# Patient Record
Sex: FEMALE | Race: WHITE | NOT HISPANIC OR LATINO | Employment: UNEMPLOYED | URBAN - METROPOLITAN AREA
[De-identification: names, ages, dates, MRNs, and addresses within clinical notes are randomized per-mention and may not be internally consistent; named-entity substitution may affect disease eponyms.]

---

## 2017-01-19 ENCOUNTER — TELEPHONE (OUTPATIENT)
Dept: FAMILY MEDICINE CLINIC | Facility: CLINIC | Age: 52
End: 2017-01-19

## 2017-01-19 RX ORDER — METAXALONE 800 MG/1
800 TABLET ORAL 3 TIMES DAILY PRN
Qty: 10 TABLET | Refills: 0 | Status: SHIPPED | OUTPATIENT
Start: 2017-01-19 | End: 2019-02-27

## 2017-01-19 NOTE — TELEPHONE ENCOUNTER
"Pt is asking for \"5 pills\" of muscle relaxer, was given last summer has a pinched nerve and is currently out of town.  Was given Metaxalone 800mg.   is going to  and bring to her.  "

## 2017-11-07 ENCOUNTER — OFFICE VISIT (OUTPATIENT)
Dept: SLEEP MEDICINE | Facility: HOSPITAL | Age: 52
End: 2017-11-07
Attending: INTERNAL MEDICINE

## 2017-11-07 VITALS
SYSTOLIC BLOOD PRESSURE: 135 MMHG | DIASTOLIC BLOOD PRESSURE: 73 MMHG | HEIGHT: 68 IN | WEIGHT: 230 LBS | HEART RATE: 80 BPM | BODY MASS INDEX: 34.86 KG/M2

## 2017-11-07 DIAGNOSIS — G47.33 OSA (OBSTRUCTIVE SLEEP APNEA): Primary | ICD-10-CM

## 2017-11-07 PROCEDURE — G0463 HOSPITAL OUTPT CLINIC VISIT: HCPCS

## 2017-11-07 NOTE — PROGRESS NOTES
"  Sleep clinic follow up note.  [facility] SLEEP MEDICINE    Mukul Chahal  52 y.o.  female  1965    PCP: Anton Rubio DO      Date of visit: 11/7/2017    INTERM HISTORY:  This is a 52 y.o. years old patient who has a history of sleep apnea (AHI 13?hr) and is on CPAP auto.  Patient reports good compliance with the device and has no problems.  Denies snoring, daytime excessive sleepiness.  She reports that she feels well rested when she wakes up.  She cannot imagine going without CPAP for a day!!!.  The Smart card download has been reviewed and shows the following..  Compliance;100 %  > 4 hr use, 93 %  Residual AHI: 0.9 /hr (normal less than 5)      PAST MEDICAL HISTORY:  · Obstructive sleep apnea  No past medical history on file.    MEDICATIONS:    Current Outpatient Prescriptions:   •  metaxalone (SKELAXIN) 800 MG tablet, Take 1 tablet by mouth 3 (Three) Times a Day As Needed for muscle spasms., Disp: 10 tablet, Rfl: 0    SOCIAL, FAMILY HISTORY: Medical record is reviewed and noted.    REVIEW OF SYSTEMS: [place] Sleepiness Scale :Total score: 11     PHYSICAL EXAMINATION:  Vitals:    11/07/17 1300   BP: 135/73   Pulse: 80   Weight: 230 lb (104 kg)   Height: 68\" (172.7 cm)    Body mass index is 34.97 kg/(m^2).    HEENT: Unremarkable, pupils are round equal and reacting to light   NECK: No lymphadenopathy, throat is clear, oral airway Mallampati class III  RESPRATORY SYSTEM: Breath sounds are equal on both sides and are normal, no wheezes or crackles  CARDIOVASULAR SYSTEM: Heart rate is regular without murmur  ABDOMEN: Soft, no ascites, no hepatosplenomegaly.  EXTREMITIES: No cyanosis, clubbing or edema    ASSESSMENT AND PLAN:  · Obstructive sleep apnea, patient is using the CPAP with good compliance for treatment of sleep apnea.  I have reviewed the smart card down load and discussed with the patient the download data and encouarged the patient to continue to use the CPAP. The residual AHI " is acceptable.  The device is benefiting the patient.  The patient is also instructed to get the CPAP supplies from the DME company and see me in 1 year for follow-up.  The DME company is [employer]  · Obesity, I have discussed weight reduction and the health benefits.  I have also discuss the relationship between the weight and the sleep apnea and encouraged the patient to lose weight        Clark Bland MD, [facility]  Pulmonary, Critical Care and sleep Medicine

## 2018-10-16 DIAGNOSIS — Z00.00 ROUTINE HEALTH MAINTENANCE: Primary | ICD-10-CM

## 2018-10-16 DIAGNOSIS — Z00.00 ANNUAL PHYSICAL EXAM: ICD-10-CM

## 2018-11-08 ENCOUNTER — OFFICE VISIT (OUTPATIENT)
Dept: SLEEP MEDICINE | Facility: HOSPITAL | Age: 53
End: 2018-11-08
Attending: INTERNAL MEDICINE

## 2018-11-08 VITALS
HEART RATE: 89 BPM | DIASTOLIC BLOOD PRESSURE: 81 MMHG | OXYGEN SATURATION: 96 % | HEIGHT: 69 IN | SYSTOLIC BLOOD PRESSURE: 122 MMHG | WEIGHT: 218.6 LBS | BODY MASS INDEX: 32.38 KG/M2

## 2018-11-08 DIAGNOSIS — J44.9 OSA AND COPD OVERLAP SYNDROME (HCC): Primary | ICD-10-CM

## 2018-11-08 DIAGNOSIS — G47.33 OSA AND COPD OVERLAP SYNDROME (HCC): Primary | ICD-10-CM

## 2018-11-08 PROCEDURE — G0463 HOSPITAL OUTPT CLINIC VISIT: HCPCS

## 2018-11-08 NOTE — PROGRESS NOTES
"  SLEEP CLINIC FOLLOW UP PROGRESS NOTE.    [facility] SLEEP MEDICINE    Mukul Chahal  53 y.o.  female  1965    PCP: Anton Rubio DO      Date of visit: 11/8/2018    INTERM HISTORY:  This is a 53 y.o. years old patient who has a history of sleep apnea and is on CPAP.  Patient reports good compliance with the device and has no problems.     The Smart card download has been reviewed and shows the following..  Compliance;100 %  > 4 hr use, 100 %  Residual AHI: 0.9 /hr (normal less than 5)      PAST MEDICAL HISTORY:  · Obstructive sleep apnea  No past medical history on file.    MEDICATIONS:    Current Outpatient Prescriptions:   •  metaxalone (SKELAXIN) 800 MG tablet, Take 1 tablet by mouth 3 (Three) Times a Day As Needed for muscle spasms., Disp: 10 tablet, Rfl: 0    Allergies   Allergen Reactions   • No Known Drug Allergy        SOCIAL, FAMILY HISTORY: Medical records are reviewed and noted.    REVIEW OF SYSTEMS:   [place] Sleepiness Scale :Total score: 9   Snoring no  Feels refreshed after waking up: yes  Morning headaches no  Nasal congestion no  Leg movements no    PHYSICAL EXAMINATION:  Vitals:    11/08/18 1522   BP: 122/81   BP Location: Left arm   Patient Position: Sitting   Pulse: 89   SpO2: 96%   Weight: 99.2 kg (218 lb 9.6 oz)   Height: 175.3 cm (69\")    Body mass index is 32.28 kg/m². Neck Circumference: 14.5 inches  HEENT: Unremarkable, pupils are round equal and reacting to light, nasal passage is clear   NECK: No lymphadenopathy, throat is clear, oral airway Mallampati class III  RESPRATORY SYSTEM: Breath sounds are equal on both sides and are normal, no wheezes or crackles  CARDIOVASULAR SYSTEM: Heart rate is regular without murmur  ABDOMEN: Soft, no ascites, no hepatosplenomegaly.  EXTREMITIES: No cyanosis, clubbing or edema       ASSESSMENT AND PLAN:  · Obstructive sleep apnea, patient is using the CPAP with good compliance for treatment of sleep apnea.  I have reviewed the " smart card down load and discussed with the patient the download data and encouarged the patient to continue to use the CPAP. The residual AHI is acceptable.  The device is benefiting the patient.  The patient is also instructed to get the CPAP supplies from the DME company and see me in 1 year for follow-up.  The DME company is [employer]  · Obesity, I have discussed weight reduction and the health benefits.  I have also discuss the relationship between the weight and the sleep apnea and encouraged the patient to lose weight  · Inadequate sleep and I have talked with patient about increasing her sleep hours she is not getting enough sleep        Clark Bland MD, [facility]  Pulmonary, Critical Care and sleep Medicine

## 2019-02-20 LAB
25(OH)D3+25(OH)D2 SERPL-MCNC: 44.6 NG/ML
ALBUMIN SERPL-MCNC: 4 G/DL (ref 3.5–5.2)
ALBUMIN/GLOB SERPL: 1.4 G/DL
ALP SERPL-CCNC: 77 U/L (ref 40–129)
ALT SERPL-CCNC: 28 U/L (ref 5–33)
AST SERPL-CCNC: 20 U/L (ref 5–32)
BASOPHILS # BLD AUTO: 0.06 10*3/MM3 (ref 0–0.2)
BASOPHILS NFR BLD AUTO: 0.6 % (ref 0–1.5)
BILIRUB SERPL-MCNC: 0.4 MG/DL (ref 0.2–1.2)
BUN SERPL-MCNC: 14 MG/DL (ref 6–20)
BUN/CREAT SERPL: 22.2 (ref 7–25)
CALCIUM SERPL-MCNC: 8.8 MG/DL (ref 8.6–10.5)
CHLORIDE SERPL-SCNC: 100 MMOL/L (ref 98–107)
CHOLEST SERPL-MCNC: 190 MG/DL (ref 0–200)
CO2 SERPL-SCNC: 26.8 MMOL/L (ref 22–29)
CREAT SERPL-MCNC: 0.63 MG/DL (ref 0.57–1)
EOSINOPHIL # BLD AUTO: 0.13 10*3/MM3 (ref 0–0.4)
EOSINOPHIL NFR BLD AUTO: 1.4 % (ref 0.3–6.2)
ERYTHROCYTE [DISTWIDTH] IN BLOOD BY AUTOMATED COUNT: 13 % (ref 12.3–15.4)
GLOBULIN SER CALC-MCNC: 2.9 GM/DL
GLUCOSE SERPL-MCNC: 122 MG/DL (ref 65–99)
HCT VFR BLD AUTO: 42.4 % (ref 34–46.6)
HDLC SERPL-MCNC: 67 MG/DL (ref 40–60)
HGB BLD-MCNC: 13.7 G/DL (ref 12–15.9)
IMM GRANULOCYTES # BLD AUTO: 0.04 10*3/MM3 (ref 0–0.05)
IMM GRANULOCYTES NFR BLD AUTO: 0.4 % (ref 0–0.5)
LDLC SERPL CALC-MCNC: 90 MG/DL (ref 0–100)
LYMPHOCYTES # BLD AUTO: 2.04 10*3/MM3 (ref 0.7–3.1)
LYMPHOCYTES NFR BLD AUTO: 21.6 % (ref 19.6–45.3)
MCH RBC QN AUTO: 30.4 PG (ref 26.6–33)
MCHC RBC AUTO-ENTMCNC: 32.3 G/DL (ref 31.5–35.7)
MCV RBC AUTO: 94 FL (ref 79–97)
MONOCYTES # BLD AUTO: 1.01 10*3/MM3 (ref 0.1–0.9)
MONOCYTES NFR BLD AUTO: 10.7 % (ref 5–12)
NEUTROPHILS # BLD AUTO: 6.17 10*3/MM3 (ref 1.4–7)
NEUTROPHILS NFR BLD AUTO: 65.3 % (ref 42.7–76)
NRBC BLD AUTO-RTO: 0 /100 WBC (ref 0–0)
PLATELET # BLD AUTO: 352 10*3/MM3 (ref 140–450)
POTASSIUM SERPL-SCNC: 4.3 MMOL/L (ref 3.5–5.2)
PROT SERPL-MCNC: 6.9 G/DL (ref 6–8.5)
RBC # BLD AUTO: 4.51 10*6/MM3 (ref 3.77–5.28)
SODIUM SERPL-SCNC: 138 MMOL/L (ref 136–145)
TRIGL SERPL-MCNC: 163 MG/DL (ref 0–150)
TSH SERPL DL<=0.005 MIU/L-ACNC: 1.38 MIU/ML (ref 0.27–4.2)
VLDLC SERPL CALC-MCNC: 32.6 MG/DL (ref 7–27)
WBC # BLD AUTO: 9.45 10*3/MM3 (ref 3.4–10.8)

## 2019-02-22 LAB
HBA1C MFR BLD: 6 % (ref 4.8–5.6)
Lab: NORMAL
WRITTEN AUTHORIZATION: NORMAL

## 2019-02-27 ENCOUNTER — OFFICE VISIT (OUTPATIENT)
Dept: FAMILY MEDICINE CLINIC | Facility: CLINIC | Age: 54
End: 2019-02-27

## 2019-02-27 VITALS
BODY MASS INDEX: 34.21 KG/M2 | SYSTOLIC BLOOD PRESSURE: 138 MMHG | WEIGHT: 231 LBS | HEIGHT: 69 IN | HEART RATE: 94 BPM | DIASTOLIC BLOOD PRESSURE: 86 MMHG | OXYGEN SATURATION: 98 %

## 2019-02-27 DIAGNOSIS — E78.1 HYPERTRIGLYCERIDEMIA: ICD-10-CM

## 2019-02-27 DIAGNOSIS — Z12.39 ENCOUNTER FOR SCREENING FOR MALIGNANT NEOPLASM OF BREAST: ICD-10-CM

## 2019-02-27 DIAGNOSIS — R73.02 GLUCOSE INTOLERANCE (IMPAIRED GLUCOSE TOLERANCE): ICD-10-CM

## 2019-02-27 DIAGNOSIS — E66.09 EXOGENOUS OBESITY: ICD-10-CM

## 2019-02-27 DIAGNOSIS — Z00.01 ENCOUNTER FOR WELL ADULT EXAM WITH ABNORMAL FINDINGS: Primary | ICD-10-CM

## 2019-02-27 LAB
DEVELOPER EXPIRATION DATE: NORMAL
DEVELOPER LOT NUMBER: NORMAL
EXPIRATION DATE: NORMAL
FECAL OCCULT BLOOD SCREEN, POC: NEGATIVE
Lab: NORMAL
NEGATIVE CONTROL: NEGATIVE
POSITIVE CONTROL: POSITIVE

## 2019-02-27 PROCEDURE — 99396 PREV VISIT EST AGE 40-64: CPT | Performed by: FAMILY MEDICINE

## 2019-02-27 PROCEDURE — 82270 OCCULT BLOOD FECES: CPT | Performed by: FAMILY MEDICINE

## 2019-02-27 NOTE — PROGRESS NOTES
"Subjective   Mukul Chahal is a 53 y.o. woman who comes in today for a  pap smear only. Patient is a  2 para 1 Ab 1 with last menstrual period of approximately 2 weeks ago.  Her most recent annual exam was more than 1 year ago ago and showed no abnormalities. Previous abnormal Pap smears: no. Contraception: abstinence. Last mammogram 2016. Last Dexa scan never.  Patient continues to have monthly menses which are still on a relatively regular basis.  Past medical history is somewhat sparse and patient is on no prescriptive medications.  She is  and last sexual encounter was a year ago.  Much of this is secondary to some vaginal discomfort with intercourse.  The couple has not tried lubricants and other forms of treatment.    The following portions of the patient's history were reviewed and updated as appropriate: allergies, current medications, past family history, past medical history, past social history, past surgical history and problem list.    Review of Systems  Pertinent items are noted in HPI.    neck is supple without adenopathy or thyromegaly.  Carotid upstrokes +2 and symmetrical with no evidence of bruit bilaterally.  Heart regular rhythm without murmur gallop or rub.  Lungs clear to auscultation with good bilateral breath sounds.  Abdomen is soft and flat with positive normoactive bowel sounds.  There is no evidence of mass organomegaly.  No evidence of tenderness with deep palpation.  No evidence of guarding or rebound.  Distal pulses are +2 and symmetrical.  Breasts are symmetrical with nipples that are inverted/everted and with/without discharge.  Breasts are nontender and without skin changes.  There is no mass palpable.    Objective   /86   Pulse 94   Ht 175.3 cm (69\")   Wt 105 kg (231 lb)   LMP 2019   SpO2 98%   BMI 34.11 kg/m²   Pelvic Exam: cervix normal in appearance, external genitalia normal, no adnexal masses or tenderness, no bladder tenderness, " no cervical motion tenderness, perianal skin: no external genital warts noted, rectovaginal septum normal, urethra without abnormality or discharge, uterus normal size, shape, and consistency and vagina normal without discharge. Pap smear obtained.  Office Visit on 02/27/2019   Component Date Value Ref Range Status   • Fecal Occult Blood 02/27/2019 Negative  Negative Final   • Lot Number 02/27/2019 [id number]   Final   • Expiration Date 02/27/2019 02/29/2020   Final   • DEVELOPER LOT NUMBER 02/27/2019 [id number]   Final   • DEVELOPER EXPIRATION DATE 02/27/2019 02/29/2020   Final   • Positive Control 02/27/2019 Positive  Positive Final   • Negative Control 02/27/2019 Negative  Negative Final     Office Visit on 02/27/2019   Component Date Value Ref Range Status   • Fecal Occult Blood 02/27/2019 Negative  Negative Final   • Lot Number 02/27/2019 [id number]   Final   • Expiration Date 02/27/2019 02/29/2020   Final   • DEVELOPER LOT NUMBER 02/27/2019 [id number]   Final   • DEVELOPER EXPIRATION DATE 02/27/2019 02/29/2020   Final   • Positive Control 02/27/2019 Positive  Positive Final   • Negative Control 02/27/2019 Negative  Negative Final     See labs 2/30/2019    Assessment/Plan   Screening pap smear.    Follow up in 1 year, or as indicated by Pap results.    Mukul was seen today for annual exam.    Diagnoses and all orders for this visit:    Encounter for well adult exam with abnormal findings  -     Pap IG, Ct-Ng, Rfx HPV All  -     POCT occult blood x 1 stool    Encounter for screening for malignant neoplasm of breast  -     Mammo Screening Bilateral With CAD; Future  -     Mammo Screening Bilateral With CAD    Glucose intolerance (impaired glucose tolerance)    Exogenous obesity    Hypertriglyceridemia        No current outpatient medications on file.

## 2019-02-28 PROBLEM — R73.02 GLUCOSE INTOLERANCE (IMPAIRED GLUCOSE TOLERANCE): Status: ACTIVE | Noted: 2019-02-28

## 2019-02-28 PROBLEM — E78.1 HYPERTRIGLYCERIDEMIA: Status: ACTIVE | Noted: 2019-02-28

## 2019-02-28 NOTE — PATIENT INSTRUCTIONS
Long discussion in regards to her sugar status as well as triglycerides.  Weight loss will be beneficial which will include a more disciplined diet and exercise program.  This will include limiting her carbohydrate intake which will certainly influence the above issues.  Discussion also in regards to vitamin D with recommendation to start vitamin D3 at approximately 5000 IU/day.  Have recommended rechecking fasting laboratory studies in 6 months to include CBC, CMP, lipids, A1c, and vitamin D.

## 2019-03-04 LAB
C TRACH RRNA CVX QL NAA+PROBE: NEGATIVE
CONV .: NORMAL
CYTOLOGIST CVX/VAG CYTO: NORMAL
CYTOLOGY CVX/VAG DOC THIN PREP: NORMAL
DX ICD CODE: NORMAL
HIV 1 & 2 AB SER-IMP: NORMAL
N GONORRHOEA RRNA CVX QL NAA+PROBE: NEGATIVE
OTHER STN SPEC: NORMAL
PATH REPORT.FINAL DX SPEC: NORMAL
STAT OF ADQ CVX/VAG CYTO-IMP: NORMAL

## 2019-11-07 ENCOUNTER — OFFICE VISIT (OUTPATIENT)
Dept: SLEEP MEDICINE | Facility: HOSPITAL | Age: 54
End: 2019-11-07
Attending: INTERNAL MEDICINE

## 2019-11-07 VITALS
SYSTOLIC BLOOD PRESSURE: 134 MMHG | HEIGHT: 69 IN | WEIGHT: 233 LBS | DIASTOLIC BLOOD PRESSURE: 69 MMHG | BODY MASS INDEX: 34.51 KG/M2 | OXYGEN SATURATION: 98 % | HEART RATE: 83 BPM

## 2019-11-07 DIAGNOSIS — J44.9 OSA AND COPD OVERLAP SYNDROME (HCC): Primary | ICD-10-CM

## 2019-11-07 DIAGNOSIS — G47.33 OSA AND COPD OVERLAP SYNDROME (HCC): Primary | ICD-10-CM

## 2019-11-07 PROCEDURE — G0463 HOSPITAL OUTPT CLINIC VISIT: HCPCS

## 2019-11-07 PROCEDURE — 99213 OFFICE O/P EST LOW 20 MIN: CPT | Performed by: INTERNAL MEDICINE

## 2019-11-07 NOTE — PROGRESS NOTES
"  [facility]  [address] [facility]  3rd [place] [address]  Phone   Fax       SLEEP CLINIC FOLLOW UP PROGRESS NOTE.    Mukul Chahal  1965  54 y.o.  female      PCP: Anton Rubio DO      Date of visit: 11/13/2019    Chief Complaint   Patient presents with   • Sleep Apnea   • Follow-up       INTERM HISTORY:  This is a 54 y.o. years old patient who has a history of sleep apnea and is on CPAP.  Patient reports good compliance with the device.  She says that the humidity is set too high and is putting out too much water.  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    Sleep schedule  Normally goes to bed at 11 PM  Wakes up at 6 AM  Feels refreshed after waking up: Yes    The Smart card downloaded on 11/13/2019 has been reviewed by me and shows the following..  Compliance; 100 %  > 4 hr use, 100 %  Average use of the device 5 hours and 59 minutes per night  Residual AHI: 1.2 /hr (normal less than 5)      History reviewed. No pertinent past medical history.    MEDICATIONS: reviewed by me  No current outpatient medications on file.    Allergies   Allergen Reactions   • No Known Drug Allergy     reviewed by me    SOCIAL, FAMILY HISTORY: Medical records are reviewed and noted by me.    REVIEW OF SYSTEMS:   [place] Sleepiness Scale :Total score: 12   Snoring: Resolved  Morning headache: No  Nasal congestion: No      PHYSICAL EXAMINATION:  Vitals:    11/07/19 1508   BP: 134/69   Pulse: 83   SpO2: 98%   Weight: 106 kg (233 lb)   Height: 175.3 cm (69\")    Body mass index is 34.41 kg/m².    HEENT: pupils are round equal and reacting to light and accommodation, nasal passage is clear, no nasal polyps, no lymphadenopathy, throat is clear, oral airway Mallampati class 3  RESPRATORY SYSTEM: Breath sounds are equal on both sides and are normal, no wheezes or crackles  CARDIOVASULAR SYSTEM: Heart rate is regular without murmur  ABDOMEN: Soft, no ascites, no " hepatosplenomegaly.  EXTREMITIES: No cyanosis, clubbing or edema       ASSESSMENT AND PLAN:  · Obstructive sleep apnea, patient is using the CPAP with good compliance for treatment of sleep apnea.  I have reviewed the smart card down load and discussed with the patient the download data and encouarged the patient to continue to use the CPAP. The residual AHI is acceptable.  The device is benefiting the patient.  The patient is also instructed to get the CPAP supplies from the DME company and see me in 1 year for follow-up.  The prescription for supplies has been sent to the [employer].  The DME company is [employer].  I have reduced the humidity to 4 from 5  · Obesity, patient's BMI is Body mass index is 34.41 kg/m²..  I have discussed weight reduction and the health benefits.  I have also discuss the relationship between the weight and sleep apnea and encouraged the patient to lose weight.          Clark Bland MD, [facility]  Sleep Medicine.(Board-certified)  [facility]   11/13/2019

## 2020-01-13 ENCOUNTER — HOSPITAL ENCOUNTER (OUTPATIENT)
Dept: GENERAL RADIOLOGY | Facility: HOSPITAL | Age: 55
Discharge: HOME | End: 2020-01-13
Admitting: FAMILY MEDICINE

## 2020-01-13 ENCOUNTER — OFFICE VISIT (OUTPATIENT)
Dept: FAMILY MEDICINE CLINIC | Facility: CLINIC | Age: 55
End: 2020-01-13

## 2020-01-13 ENCOUNTER — HOSPITAL ENCOUNTER (OUTPATIENT)
Dept: GENERAL RADIOLOGY | Facility: HOSPITAL | Age: 55
Discharge: HOME | End: 2020-01-13

## 2020-01-13 VITALS
WEIGHT: 231 LBS | HEART RATE: 89 BPM | DIASTOLIC BLOOD PRESSURE: 72 MMHG | SYSTOLIC BLOOD PRESSURE: 128 MMHG | BODY MASS INDEX: 34.21 KG/M2 | OXYGEN SATURATION: 97 % | HEIGHT: 69 IN

## 2020-01-13 DIAGNOSIS — F41.8 DEPRESSION WITH ANXIETY: ICD-10-CM

## 2020-01-13 DIAGNOSIS — G89.29 CHRONIC PAIN OF LEFT ANKLE: ICD-10-CM

## 2020-01-13 DIAGNOSIS — M25.572 CHRONIC PAIN OF LEFT ANKLE: ICD-10-CM

## 2020-01-13 DIAGNOSIS — M72.2 PLANTAR FASCIITIS: Primary | ICD-10-CM

## 2020-01-13 DIAGNOSIS — F43.0 STRESS REACTION: ICD-10-CM

## 2020-01-13 PROCEDURE — 99214 OFFICE O/P EST MOD 30 MIN: CPT | Performed by: FAMILY MEDICINE

## 2020-01-13 PROCEDURE — 73620 X-RAY EXAM OF FOOT: CPT

## 2020-01-13 PROCEDURE — 73610 X-RAY EXAM OF ANKLE: CPT

## 2020-01-14 NOTE — PROGRESS NOTES
Subjective   Mukul Chahal is a 54 y.o. female with   Chief Complaint   Patient presents with   • heel spur and pain     left   .    History of Present Illness   54-year-old white female here with multiple issues and for further medical management.  Patient complains of chronic left heel as well as ankle pain.  There is been no trauma or initiating event.  Believes she has plantar fasciitis and has a known heel spur.  She also complains of left lateral ankle pain contained soft tissue swelling and pain without knowledge of trauma or initiating event.  Range of motion remains full with motor that is intact as well as sensation.  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  Patient does not elaborate as to why.  She also feels that she has been suffering from depression for several years and is now coming to a point where she is getting ready to have this treated.  Moods are frequently low with increased amount of tearfulness.  Denies suicidal or homicidal ideation.  The following portions of the patient's history were reviewed and updated as appropriate: allergies, current medications, past family history, past medical history, past social history, past surgical history and problem list.    Review of Systems   Constitutional: Positive for fatigue.   Musculoskeletal: Positive for arthralgias.   Psychiatric/Behavioral: Positive for dysphoric mood and sleep disturbance. Negative for self-injury and suicidal ideas. The patient is nervous/anxious.        Objective     Vitals:    01/13/20 0909   BP: 128/72   Pulse: 89   SpO2: 97%       No results found for this or any previous visit (from the past 672 hour(s)).    Physical Exam   Constitutional: She is oriented to person, place, and time. She appears well-developed and well-nourished.   Obese with a BMI of 34.1   HENT:   Head: Normocephalic and atraumatic.    Neck: Neck supple.   Musculoskeletal:   Left ankle with appears to be soft tissue edema portion surrounding the lateral malleolus.  Left ankle and foot demonstrate full range of motion, motor 5/5 and neurovascular intact distally.  Point of maximum tenderness is in the calcaneal tubercle with some generalized tenderness in the lateral ankle region.  No evidence of erythema or increased local heat.   Neurological: She is alert and oriented to person, place, and time. She has normal strength. No sensory deficit. Gait normal.   Skin: Skin is warm and dry. No rash noted.   Psychiatric: Her speech is normal and behavior is normal. Judgment and thought content normal. Her affect is labile. Cognition and memory are normal.   Patient is tearful through much of this conversation.   Nursing note and vitals reviewed.      Assessment/Plan   Mukul was seen today for heel spur and pain.    Diagnoses and all orders for this visit:    Plantar fasciitis  -     XR Foot 2 View Left    Chronic pain of left ankle  -     XR Ankle 3+ View Left    Depression with anxiety    Stress reaction    X-rays will be obtained of left ankle and foot.  Plantar fasciitis handout has been given with recommendation to acquire a Zenput crosstrainer insole.  Patient will follow-up in this office within the next 2 weeks and further discussion will take place in regards to her stress reaction and depression.  Have begun to lay the ground work for the use of medication.    Return in about 4 weeks (around 2/10/2020) for Recheck.

## 2020-01-20 DIAGNOSIS — R73.02 GLUCOSE INTOLERANCE (IMPAIRED GLUCOSE TOLERANCE): ICD-10-CM

## 2020-01-20 DIAGNOSIS — E78.1 HYPERTRIGLYCERIDEMIA: Primary | ICD-10-CM

## 2020-01-20 LAB
ALBUMIN SERPL-MCNC: 4.4 G/DL (ref 3.5–5.2)
ALBUMIN/GLOB SERPL: 1.7 G/DL
ALP SERPL-CCNC: 88 U/L (ref 39–117)
ALT SERPL-CCNC: 32 U/L (ref 1–33)
AST SERPL-CCNC: 19 U/L (ref 1–32)
BASOPHILS # BLD AUTO: 0.05 10*3/MM3 (ref 0–0.2)
BASOPHILS NFR BLD AUTO: 0.6 % (ref 0–1.5)
BILIRUB SERPL-MCNC: 0.5 MG/DL (ref 0.2–1.2)
BUN SERPL-MCNC: 11 MG/DL (ref 6–20)
BUN/CREAT SERPL: 17.2 (ref 7–25)
CALCIUM SERPL-MCNC: 9.4 MG/DL (ref 8.6–10.5)
CHLORIDE SERPL-SCNC: 100 MMOL/L (ref 98–107)
CHOLEST SERPL-MCNC: 213 MG/DL (ref 0–200)
CO2 SERPL-SCNC: 25.9 MMOL/L (ref 22–29)
CREAT SERPL-MCNC: 0.64 MG/DL (ref 0.57–1)
EOSINOPHIL # BLD AUTO: 0.1 10*3/MM3 (ref 0–0.4)
EOSINOPHIL NFR BLD AUTO: 1.2 % (ref 0.3–6.2)
ERYTHROCYTE [DISTWIDTH] IN BLOOD BY AUTOMATED COUNT: 11.8 % (ref 12.3–15.4)
GLOBULIN SER CALC-MCNC: 2.6 GM/DL
GLUCOSE SERPL-MCNC: 134 MG/DL (ref 65–99)
HBA1C MFR BLD: 6.8 % (ref 4.8–5.6)
HCT VFR BLD AUTO: 40.6 % (ref 34–46.6)
HDLC SERPL-MCNC: 63 MG/DL (ref 40–60)
HGB BLD-MCNC: 14 G/DL (ref 12–15.9)
IMM GRANULOCYTES # BLD AUTO: 0.01 10*3/MM3 (ref 0–0.05)
IMM GRANULOCYTES NFR BLD AUTO: 0.1 % (ref 0–0.5)
LDLC SERPL CALC-MCNC: 116 MG/DL (ref 0–100)
LYMPHOCYTES # BLD AUTO: 1.77 10*3/MM3 (ref 0.7–3.1)
LYMPHOCYTES NFR BLD AUTO: 22 % (ref 19.6–45.3)
MCH RBC QN AUTO: 30.8 PG (ref 26.6–33)
MCHC RBC AUTO-ENTMCNC: 34.5 G/DL (ref 31.5–35.7)
MCV RBC AUTO: 89.2 FL (ref 79–97)
MONOCYTES # BLD AUTO: 0.62 10*3/MM3 (ref 0.1–0.9)
MONOCYTES NFR BLD AUTO: 7.7 % (ref 5–12)
NEUTROPHILS # BLD AUTO: 5.5 10*3/MM3 (ref 1.7–7)
NEUTROPHILS NFR BLD AUTO: 68.4 % (ref 42.7–76)
NRBC BLD AUTO-RTO: 0 /100 WBC (ref 0–0.2)
PLATELET # BLD AUTO: 349 10*3/MM3 (ref 140–450)
POTASSIUM SERPL-SCNC: 4.6 MMOL/L (ref 3.5–5.2)
PROT SERPL-MCNC: 7 G/DL (ref 6–8.5)
RBC # BLD AUTO: 4.55 10*6/MM3 (ref 3.77–5.28)
SODIUM SERPL-SCNC: 140 MMOL/L (ref 136–145)
TRIGL SERPL-MCNC: 172 MG/DL (ref 0–150)
TSH SERPL DL<=0.005 MIU/L-ACNC: 0.86 UIU/ML (ref 0.27–4.2)
VLDLC SERPL CALC-MCNC: 34.4 MG/DL
WBC # BLD AUTO: 8.05 10*3/MM3 (ref 3.4–10.8)

## 2020-02-03 ENCOUNTER — OFFICE VISIT (OUTPATIENT)
Dept: FAMILY MEDICINE CLINIC | Facility: CLINIC | Age: 55
End: 2020-02-03

## 2020-02-03 VITALS
HEART RATE: 73 BPM | DIASTOLIC BLOOD PRESSURE: 76 MMHG | BODY MASS INDEX: 34.8 KG/M2 | OXYGEN SATURATION: 99 % | SYSTOLIC BLOOD PRESSURE: 126 MMHG | HEIGHT: 69 IN | WEIGHT: 235 LBS

## 2020-02-03 DIAGNOSIS — E11.9 CONTROLLED TYPE 2 DIABETES MELLITUS WITHOUT COMPLICATION, WITHOUT LONG-TERM CURRENT USE OF INSULIN (HCC): ICD-10-CM

## 2020-02-03 DIAGNOSIS — G89.29 CHRONIC PAIN OF LEFT ANKLE: Primary | ICD-10-CM

## 2020-02-03 DIAGNOSIS — E78.2 MIXED HYPERLIPIDEMIA: ICD-10-CM

## 2020-02-03 DIAGNOSIS — M25.472 LEFT ANKLE SWELLING: ICD-10-CM
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
## 2020-02-03 DIAGNOSIS — M25.572 CHRONIC PAIN OF LEFT ANKLE: Primary | ICD-10-CM

## 2020-02-03 DIAGNOSIS — E66.09 EXOGENOUS OBESITY: ICD-10-CM

## 2020-02-03 PROCEDURE — 99214 OFFICE O/P EST MOD 30 MIN: CPT | Performed by: FAMILY MEDICINE

## 2020-02-04 PROBLEM — R73.02 GLUCOSE INTOLERANCE (IMPAIRED GLUCOSE TOLERANCE): Status: RESOLVED | Noted: 2019-02-28 | Resolved: 2020-02-04

## 2020-02-04 PROBLEM — E11.9 CONTROLLED TYPE 2 DIABETES MELLITUS WITHOUT COMPLICATION, WITHOUT LONG-TERM CURRENT USE OF INSULIN (HCC): Status: ACTIVE | Noted: 2020-02-04

## 2020-02-04 PROBLEM — E78.2 MIXED HYPERLIPIDEMIA: Status: ACTIVE | Noted: 2020-02-04

## 2020-02-04 PROBLEM — E78.1 HYPERTRIGLYCERIDEMIA: Status: RESOLVED | Noted: 2019-02-28 | Resolved: 2020-02-04

## 2020-02-04 LAB
ANA SER QL: NEGATIVE
ERYTHROCYTE [SEDIMENTATION RATE] IN BLOOD BY WESTERGREN METHOD: 13 MM/HR (ref 0–40)
RHEUMATOID FACT SERPL-ACNC: <10 IU/ML (ref 0–13.9)
URATE SERPL-MCNC: 5.2 MG/DL (ref 2.5–7.1)

## 2020-02-05 NOTE — PROGRESS NOTES
Subjective   Mukul Chahal is a 54 y.o. female with   Chief Complaint   Patient presents with   • prediabetic   • Foot Pain     left heel   .    History of Present Illness   54-year-old white female with history of glucose intolerance preceded by fasting laboratory studies.  Patient is here for review of same.  She has not done well over the holidays having gained weight.  She has not participated in an exercise program primarily to an ankle injury that occurred several months ago that continues to have swelling in the lateral portion of the left ankle.  The ankle still causes pain and prevents her from exercising.  She also has history of other arthralgias with some morning stiffness and increased soft tissue swelling.  These appear to be both hands and at times both wrists and both knees.  Patient also complains of left heel pain which seems to be worse upon initially weightbearing.  This does improve with ongoing walking only to recur again.  Patient is currently on no medication.  She does have [place] and COPD.  There is also a history of hypertriglyceridemia.  Patient also with recent tick bite which left no ashlie including no evidence of rash or other arthralgias.  She does wonder about Lyme's.  The following portions of the patient's history were reviewed and updated as appropriate: allergies, current medications, past family history, past medical history, past social history, past surgical history and problem list.    Review of Systems   Constitutional:        Obese   Cardiovascular:        Hypertriglyceridemia   Endocrine:        Glucose intolerance   Musculoskeletal: Positive for arthralgias and joint swelling.       Objective     Vitals:    02/03/20 1425   BP: 126/76   Pulse: 73   SpO2: 99%       Recent Results (from the past 672 hour(s))   CBC & Differential    Collection Time: 01/20/20 10:45 AM   Result Value Ref Range    WBC 8.05 3.40 - 10.80 10*3/mm3    RBC 4.55 3.77 - 5.28 10*6/mm3    Hemoglobin 14.0  12.0 - 15.9 g/dL    Hematocrit 40.6 34.0 - 46.6 %    MCV 89.2 79.0 - 97.0 fL    MCH 30.8 26.6 - 33.0 pg    MCHC 34.5 31.5 - 35.7 g/dL    RDW 11.8 (L) 12.3 - 15.4 %    Platelets 349 140 - 450 10*3/mm3    Neutrophil Rel % 68.4 42.7 - 76.0 %    Lymphocyte Rel % 22.0 19.6 - 45.3 %    Monocyte Rel % 7.7 5.0 - 12.0 %    Eosinophil Rel % 1.2 0.3 - 6.2 %    Basophil Rel % 0.6 0.0 - 1.5 %    Neutrophils Absolute 5.50 1.70 - 7.00 10*3/mm3    Lymphocytes Absolute 1.77 0.70 - 3.10 10*3/mm3    Monocytes Absolute 0.62 0.10 - 0.90 10*3/mm3    Eosinophils Absolute 0.10 0.00 - 0.40 10*3/mm3    Basophils Absolute 0.05 0.00 - 0.20 10*3/mm3    Immature Granulocyte Rel % 0.1 0.0 - 0.5 %    Immature Grans Absolute 0.01 0.00 - 0.05 10*3/mm3    nRBC 0.0 0.0 - 0.2 /100 WBC   Comprehensive Metabolic Panel    Collection Time: 01/20/20 10:45 AM   Result Value Ref Range    Glucose 134 (H) 65 - 99 mg/dL    BUN 11 6 - 20 mg/dL    Creatinine 0.64 0.57 - 1.00 mg/dL    eGFR Non African Am 97 >60 mL/min/1.73    eGFR African Am 117 >60 mL/min/1.73    BUN/Creatinine Ratio 17.2 7.0 - 25.0    Sodium 140 136 - 145 mmol/L    Potassium 4.6 3.5 - 5.2 mmol/L    Chloride 100 98 - 107 mmol/L    Total CO2 25.9 22.0 - 29.0 mmol/L    Calcium 9.4 8.6 - 10.5 mg/dL    Total Protein 7.0 6.0 - 8.5 g/dL    Albumin 4.40 3.50 - 5.20 g/dL    Globulin 2.6 gm/dL    A/G Ratio 1.7 g/dL    Total Bilirubin 0.5 0.2 - 1.2 mg/dL    Alkaline Phosphatase 88 39 - 117 U/L    AST (SGOT) 19 1 - 32 U/L    ALT (SGPT) 32 1 - 33 U/L   Hemoglobin A1c    Collection Time: 01/20/20 10:45 AM   Result Value Ref Range    Hemoglobin A1C 6.80 (H) 4.80 - 5.60 %   Lipid Panel    Collection Time: 01/20/20 10:45 AM   Result Value Ref Range    Total Cholesterol 213 (H) 0 - 200 mg/dL    Triglycerides 172 (H) 0 - 150 mg/dL    HDL Cholesterol 63 (H) 40 - 60 mg/dL    VLDL Cholesterol 34.4 mg/dL    LDL Cholesterol  116 (H) 0 - 100 mg/dL   TSH    Collection Time: 01/20/20 10:45 AM   Result Value Ref Range     TSH 0.863 0.270 - 4.200 uIU/mL   Uric acid    Collection Time: 02/03/20  3:18 PM   Result Value Ref Range    Uric Acid 5.2 2.5 - 7.1 mg/dL   ROSY    Collection Time: 02/03/20  3:18 PM   Result Value Ref Range    ROSY Direct Negative Negative   Sedimentation Rate    Collection Time: 02/03/20  3:18 PM   Result Value Ref Range    Sed Rate 13 0 - 40 mm/hr   Rheumatoid Factor, Quant    Collection Time: 02/03/20  3:18 PM   Result Value Ref Range    RA Latex Turbid <10.0 0.0 - 13.9 IU/mL       Physical Exam   Constitutional: She is oriented to person, place, and time. She appears well-developed and well-nourished.   HENT:   Head: Normocephalic and atraumatic.   Neck: Trachea normal and phonation normal. Neck supple. Normal carotid pulses present. Carotid bruit is not present. No thyroid mass and no thyromegaly present.   Cardiovascular: Normal rate, regular rhythm and normal heart sounds. Exam reveals no gallop and no friction rub.   No murmur heard.  Pulmonary/Chest: Effort normal and breath sounds normal. No respiratory distress. She has no decreased breath sounds. She has no wheezes. She has no rhonchi. She has no rales.   Musculoskeletal:   Left ankle with soft tissue swelling surrounding the lateral malleolus.  Range of motion is full with motor 5/5 and neurovascular intact distally.  No evidence of bony tenderness.   Lymphadenopathy:     She has no cervical adenopathy.   Neurological: She is alert and oriented to person, place, and time. She has normal strength. No sensory deficit. Gait normal.   Skin: Skin is warm and dry. No rash noted.   Psychiatric: She has a normal mood and affect. Her speech is normal and behavior is normal. Judgment and thought content normal. Cognition and memory are normal.   Nursing note and vitals reviewed.      Assessment/Plan   Mukul was seen today for prediabetic and foot pain.    Diagnoses and all orders for this visit:    Chronic pain of left ankle  -     Uric acid  -     Tick  Panel - Blood, Arm, Left  -     ROSY  -     Sedimentation Rate  -     Rheumatoid Factor, Quant    Left ankle swelling  -     Uric acid  -     Tick Panel - Blood, Arm, Left  -     ROSY  -     Sedimentation Rate  -     Rheumatoid Factor, Quant    Controlled type 2 diabetes mellitus without complication, without long-term current use of insulin (CMS/[facility])    Mixed hyperlipidemia    Exogenous obesity    Tick bite, initial encounter    Have had long conversation in regards to the treatment of type II non-insulin-dependent diabetes mellitus.  Have offered patient an additional chance to lose weight as well as watch cholesterol in her diet.  She chooses this option rather than medication and states that she will comply.  Fasting labs will be acquired in 3 to 4 months with follow-up in this office thereafter.  Arthritic studies will be run in regards to arthralgias and soft tissue swelling.  Further disposition/follow-up based on the above.    Return if symptoms worsen or fail to improve.

## 2020-03-03 ENCOUNTER — OFFICE VISIT (OUTPATIENT)
Dept: FAMILY MEDICINE CLINIC | Facility: CLINIC | Age: 55
End: 2020-03-03

## 2020-03-03 VITALS
HEIGHT: 69 IN | WEIGHT: 226 LBS | BODY MASS INDEX: 33.47 KG/M2 | DIASTOLIC BLOOD PRESSURE: 68 MMHG | OXYGEN SATURATION: 97 % | HEART RATE: 86 BPM | SYSTOLIC BLOOD PRESSURE: 120 MMHG

## 2020-03-03 DIAGNOSIS — F41.8 DEPRESSION WITH ANXIETY: ICD-10-CM

## 2020-03-03 DIAGNOSIS — B35.1 ONYCHOMYCOSIS: ICD-10-CM

## 2020-03-03 DIAGNOSIS — E11.9 CONTROLLED TYPE 2 DIABETES MELLITUS WITHOUT COMPLICATION, WITHOUT LONG-TERM CURRENT USE OF INSULIN (HCC): Primary | ICD-10-CM

## 2020-03-03 DIAGNOSIS — E66.09 EXOGENOUS OBESITY: ICD-10-CM

## 2020-03-03 DIAGNOSIS — E78.2 MIXED HYPERLIPIDEMIA: ICD-10-CM

## 2020-03-03 PROCEDURE — 99214 OFFICE O/P EST MOD 30 MIN: CPT | Performed by: FAMILY MEDICINE

## 2020-03-03 RX ORDER — TERBINAFINE HYDROCHLORIDE 250 MG/1
250 TABLET ORAL DAILY
Qty: 30 TABLET | Refills: 2 | Status: SHIPPED | OUTPATIENT
Start: 2020-03-03

## 2020-03-04 NOTE — PROGRESS NOTES
Subjective   Mukul Chahal is a 54 y.o. female with   Chief Complaint   Patient presents with   • Depression   .    History of Present Illness   54-year-old white female with multiple medical issues here for further medical management.  Patient with thickened discolored distorted great toenails who is ready now for medical treatment for same.  This has been diagnosis onychomycosis in the past but no treatment has been rendered.  Patient was reticent to take an oral antifungal but is ready at this point.  Distal left great toenail is actually curving distally causing discomfort.  Patient has also followed with intermittent depression for 2 years.  There have been several issues personally that seem to have exacerbated her symptoms such as the death of her mother and the dementia of her father with inability to access him frequently enough.  She is estranged from her sister who is caretaking for her father.  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  She is raising 2 adoptive children and has a biologic child in her late teens.  She sleeps readily often wakes up.  Her appetite is good.  She does state that her energy and motivation as well as concentration remain good but moods are often decreased.  Anxiety is for the most part not present.  She denies suicidal or homicidal ideation.  She has been participating in activities of usual enjoyment.  Libido is decreased.  Patient denies easy agitation or increased irritability.  The following portions of the patient's history were reviewed and updated as appropriate: allergies, current medications, past family history, past medical history, past social history, past surgical history and problem list.    Review of Systems   Skin:        Onychomycosis   Psychiatric/Behavioral: Positive for dysphoric mood.       Objective     Vitals:    03/03/20 1334   BP: 120/68   Pulse: 86   SpO2: 97%       No results found for this or any previous  visit (from the past 672 hour(s)).    Physical Exam   Constitutional: She is oriented to person, place, and time. She appears well-developed and well-nourished.   HENT:   Head: Normocephalic and atraumatic.   Neck: Trachea normal and phonation normal. Neck supple. Normal carotid pulses present. Carotid bruit is not present. No thyroid mass and no thyromegaly present.   Cardiovascular: Normal rate, regular rhythm and normal heart sounds. Exam reveals no gallop and no friction rub.   No murmur heard.  Pulmonary/Chest: Effort normal and breath sounds normal. No respiratory distress. She has no decreased breath sounds. She has no wheezes. She has no rhonchi. She has no rales.   Lymphadenopathy:     She has no cervical adenopathy.   Neurological: She is alert and oriented to person, place, and time.   Skin: Skin is warm and dry. No rash noted.   Markedly thickened and discolored left great toenail with distal distortion.   Psychiatric: She has a normal mood and affect. Her speech is normal and behavior is normal. Judgment and thought content normal. Cognition and memory are normal.   Nursing note and vitals reviewed.    Office Visit on 02/03/2020   Component Date Value Ref Range Status   • Uric Acid 02/03/2020 5.2  2.5 - 7.1 mg/dL Final               Therapeutic target for gout patients: <6.0   • ROSY Direct 02/03/2020 Negative  Negative Final   • Sed Rate 02/03/2020 13  0 - 40 mm/hr Final   • RA Latex Turbid 02/03/2020 <10.0  0.0 - 13.9 IU/mL Final   Orders Only on 01/20/2020   Component Date Value Ref Range Status   • WBC 01/20/2020 8.05  3.40 - 10.80 10*3/mm3 Final   • RBC 01/20/2020 4.55  3.77 - 5.28 10*6/mm3 Final   • Hemoglobin 01/20/2020 14.0  12.0 - 15.9 g/dL Final   • Hematocrit 01/20/2020 40.6  34.0 - 46.6 % Final   • MCV 01/20/2020 89.2  79.0 - 97.0 fL Final   • MCH 01/20/2020 30.8  26.6 - 33.0 pg Final   • MCHC 01/20/2020 34.5  31.5 - 35.7 g/dL Final   • RDW 01/20/2020 11.8* 12.3 - 15.4 % Final   • Platelets  01/20/2020 349  140 - 450 10*3/mm3 Final   • Neutrophil Rel % 01/20/2020 68.4  42.7 - 76.0 % Final   • Lymphocyte Rel % 01/20/2020 22.0  19.6 - 45.3 % Final   • Monocyte Rel % 01/20/2020 7.7  5.0 - 12.0 % Final   • Eosinophil Rel % 01/20/2020 1.2  0.3 - 6.2 % Final   • Basophil Rel % 01/20/2020 0.6  0.0 - 1.5 % Final   • Neutrophils Absolute 01/20/2020 5.50  1.70 - 7.00 10*3/mm3 Final   • Lymphocytes Absolute 01/20/2020 1.77  0.70 - 3.10 10*3/mm3 Final   • Monocytes Absolute 01/20/2020 0.62  0.10 - 0.90 10*3/mm3 Final   • Eosinophils Absolute 01/20/2020 0.10  0.00 - 0.40 10*3/mm3 Final   • Basophils Absolute 01/20/2020 0.05  0.00 - 0.20 10*3/mm3 Final   • Immature Granulocyte Rel % 01/20/2020 0.1  0.0 - 0.5 % Final   • Immature Grans Absolute 01/20/2020 0.01  0.00 - 0.05 10*3/mm3 Final   • nRBC 01/20/2020 0.0  0.0 - 0.2 /100 WBC Final   • Glucose 01/20/2020 134* 65 - 99 mg/dL Final   • BUN 01/20/2020 11  6 - 20 mg/dL Final   • Creatinine 01/20/2020 0.64  0.57 - 1.00 mg/dL Final   • eGFR Non African Am 01/20/2020 97  >60 mL/min/1.73 Final   • eGFR African Am 01/20/2020 117  >60 mL/min/1.73 Final   • BUN/Creatinine Ratio 01/20/2020 17.2  7.0 - 25.0 Final   • Sodium 01/20/2020 140  136 - 145 mmol/L Final   • Potassium 01/20/2020 4.6  3.5 - 5.2 mmol/L Final   • Chloride 01/20/2020 100  98 - 107 mmol/L Final   • Total CO2 01/20/2020 25.9  22.0 - 29.0 mmol/L Final   • Calcium 01/20/2020 9.4  8.6 - 10.5 mg/dL Final   • Total Protein 01/20/2020 7.0  6.0 - 8.5 g/dL Final   • Albumin 01/20/2020 4.40  3.50 - 5.20 g/dL Final   • Globulin 01/20/2020 2.6  gm/dL Final   • A/G Ratio 01/20/2020 1.7  g/dL Final   • Total Bilirubin 01/20/2020 0.5  0.2 - 1.2 mg/dL Final   • Alkaline Phosphatase 01/20/2020 88  39 - 117 U/L Final   • AST (SGOT) 01/20/2020 19  1 - 32 U/L Final   • ALT (SGPT) 01/20/2020 32  1 - 33 U/L Final   • Hemoglobin A1C 01/20/2020 6.80* 4.80 - 5.60 % Final    Comment: Hemoglobin A1C Ranges:  Increased Risk for  Diabetes  5.7% to 6.4%  Diabetes                     >= 6.5%  Diabetic Goal                < 7.0%     • Total Cholesterol 01/20/2020 213* 0 - 200 mg/dL Final   • Triglycerides 01/20/2020 172* 0 - 150 mg/dL Final   • HDL Cholesterol 01/20/2020 63* 40 - 60 mg/dL Final   • VLDL Cholesterol 01/20/2020 34.4  mg/dL Final   • LDL Cholesterol  01/20/2020 116* 0 - 100 mg/dL Final   • TSH 01/20/2020 0.863  0.270 - 4.200 uIU/mL Final         Assessment/Plan   Mukul was seen today for depression.    Diagnoses and all orders for this visit:    Controlled type 2 diabetes mellitus without complication, without long-term current use of insulin (CMS/[facility])  -     Lipid panel; Future  -     Hemoglobin A1c; Future  -     Comprehensive metabolic panel; Future  -     TSH; Future  -     Vitamin D 25 hydroxy; Future  -     CBC w AUTO Differential; Future    Onychomycosis  -     terbinafine (LAMISIL) 250 MG tablet; Take 1 tablet by mouth Daily.  -     Lipid panel; Future  -     Hemoglobin A1c; Future  -     Comprehensive metabolic panel; Future  -     TSH; Future  -     Vitamin D 25 hydroxy; Future  -     CBC w AUTO Differential; Future    Depression with anxiety  -     Lipid panel; Future  -     Hemoglobin A1c; Future  -     Comprehensive metabolic panel; Future  -     TSH; Future  -     Vitamin D 25 hydroxy; Future  -     CBC w AUTO Differential; Future    Exogenous obesity  -     Lipid panel; Future  -     Hemoglobin A1c; Future  -     Comprehensive metabolic panel; Future  -     TSH; Future  -     Vitamin D 25 hydroxy; Future  -     CBC w AUTO Differential; Future    Mixed hyperlipidemia  -     Lipid panel; Future  -     Hemoglobin A1c; Future  -     Comprehensive metabolic panel; Future  -     TSH; Future  -     Vitamin D 25 hydroxy; Future  -     CBC w AUTO Differential; Future    Long discussion in regard to patient's new onset type II non-insulin-dependent diabetes mellitus.  Status is no longer considered in control with LDL  goal now 70 or less.  Have discussed medications for the above with patient electing to attempt weight loss.  Have also spent 20 minutes of a 30-minute appointment spent counseling in regards to depression.  Have in detail walk-through signs and symptoms of depression and if these increase patient will contact this office for consideration antidepressant use.  Follow-up is anticipated in 3 months preceded by fasting labs unless otherwise indicated.    Return in about 3 months (around 6/3/2020) for Recheck.

## 2020-04-02 DIAGNOSIS — E11.9 CONTROLLED TYPE 2 DIABETES MELLITUS WITHOUT COMPLICATION, WITHOUT LONG-TERM CURRENT USE OF INSULIN (HCC): Primary | ICD-10-CM

## 2020-06-30 ENCOUNTER — RESULTS ENCOUNTER (OUTPATIENT)
Dept: FAMILY MEDICINE CLINIC | Facility: CLINIC | Age: 55
End: 2020-06-30

## 2020-06-30 DIAGNOSIS — F41.8 DEPRESSION WITH ANXIETY: ICD-10-CM

## 2020-06-30 DIAGNOSIS — E66.09 EXOGENOUS OBESITY: ICD-10-CM

## 2020-06-30 DIAGNOSIS — B35.1 ONYCHOMYCOSIS: ICD-10-CM

## 2020-06-30 DIAGNOSIS — E78.2 MIXED HYPERLIPIDEMIA: ICD-10-CM

## 2020-06-30 DIAGNOSIS — E11.9 CONTROLLED TYPE 2 DIABETES MELLITUS WITHOUT COMPLICATION, WITHOUT LONG-TERM CURRENT USE OF INSULIN (HCC): ICD-10-CM

## 2020-09-04 ENCOUNTER — TELEPHONE (OUTPATIENT)
Dept: FAMILY MEDICINE CLINIC | Facility: CLINIC | Age: 55
End: 2020-09-04

## 2020-09-04 NOTE — TELEPHONE ENCOUNTER
Patient called and stated that she was looking to speak with Dr. Rubio regarding a recommendation for a new podiatrist as she is looking to switch from her current provider. She was looking to discuss at an appointment, but had to cancel.    Please advise.  [phone]

## 2020-09-08 NOTE — TELEPHONE ENCOUNTER
PT was seeing Dr Roxanne Zafar but it has been close to 8 years since last seen.  She was taking oral med for toenail fungus and she states you had told her that if it didn't help you would refer her to a podiatrist.    The med didn't work.

## 2020-09-09 DIAGNOSIS — B35.1 ONYCHOMYCOSIS: Primary | ICD-10-CM

## 2020-11-12 ENCOUNTER — APPOINTMENT (OUTPATIENT)
Dept: SLEEP MEDICINE | Facility: HOSPITAL | Age: 55
End: 2020-11-12

## 2020-12-10 ENCOUNTER — OFFICE VISIT (OUTPATIENT)
Dept: SLEEP MEDICINE | Facility: HOSPITAL | Age: 55
End: 2020-12-10

## 2020-12-10 VITALS
SYSTOLIC BLOOD PRESSURE: 136 MMHG | OXYGEN SATURATION: 97 % | DIASTOLIC BLOOD PRESSURE: 70 MMHG | HEIGHT: 68 IN | WEIGHT: 227 LBS | HEART RATE: 67 BPM | BODY MASS INDEX: 34.4 KG/M2

## 2020-12-10 DIAGNOSIS — G47.33 OSA ON CPAP: Primary | ICD-10-CM

## 2020-12-10 DIAGNOSIS — E66.09 CLASS 1 OBESITY DUE TO EXCESS CALORIES WITHOUT SERIOUS COMORBIDITY WITH BODY MASS INDEX (BMI) OF 34.0 TO 34.9 IN ADULT: ICD-10-CM

## 2020-12-10 DIAGNOSIS — Z99.89 OSA ON CPAP: Primary | ICD-10-CM

## 2020-12-10 PROCEDURE — 99213 OFFICE O/P EST LOW 20 MIN: CPT | Performed by: INTERNAL MEDICINE

## 2020-12-10 PROCEDURE — G0463 HOSPITAL OUTPT CLINIC VISIT: HCPCS

## 2020-12-10 NOTE — PROGRESS NOTES
"  [facility]  [address]  3rd [place] [address]  Phone   Fax       SLEEP CLINIC FOLLOW UP PROGRESS NOTE.    Mukul Chahal  1965  55 y.o.  female      PCP: Anton Rubio DO      Date of visit: 12/10/2020    Chief Complaint   Patient presents with   • Sleep Apnea   • Follow-up       INTERM HISTORY:  This is a 55 y.o. years old patient who has a history of sleep apnea and is on CPAP.  Patient reports good compliance with the device.  Patient is here for follow-up of her sleep apnea.  She is using CPAP on a regular basis and feels much better.  [place]    Sleep schedule  Normally goes to bed at 11 PM  Wakes up at 6 AM  Do you feel refreshed after waking up ?:  Yes      The Smart card downloaded on 12/10/2020 has been reviewed by me and shows the following..  Compliance; 100%  > 4 hr use, 93%  Average use of the device 6 hours and 13 minutes per night  Residual AHI: 1.7 /hr (normal less than 5)  Mask type: Full facemask  DME: [employer]      Past Medical History:   Diagnosis Date   • [place] on CPAP     AHI 13/h       MEDICATIONS: reviewed by me    Current Outpatient Medications:   •  glucose blood test strip, USE TO CHECK BLOOD SUGAR ONCE DAILY, Disp: 100 each, Rfl: 1  •  terbinafine (LAMISIL) 250 MG tablet, Take 1 tablet by mouth Daily., Disp: 30 tablet, Rfl: 2    No Known Allergies reviewed by me    SOCIAL, FAMILY HISTORY: Medical records are reviewed and noted by me.  History of smoking no  History of alcohol use per week  Caffeine use 3    REVIEW OF SYSTEMS:   [place] Sleepiness Scale :Total score: 10   Snoring: Resolved  Morning headache: No  Nasal congestion: No  Leg movements: No  Number of times you wake up once asleep: 2  Heart burn no    PHYSICAL EXAMINATION:  Vitals:    12/10/20 1446   BP: 136/70   Pulse: 67   SpO2: 97%   Weight: 103 kg (227 lb)   Height: 172.7 cm (68\")    Body mass index is 34.52 kg/m².  "   HEENT: pupils are round equal and reacting to light and accommodation, nasal passage is clear, no nasal polyps, no lymphadenopathy, throat is clear, oral airway Mallampati class 3  RESPRATORY SYSTEM: Breath sounds are equal on both sides and are normal, no wheezes or crackles  CARDIOVASULAR SYSTEM: Heart rate is regular without murmur  ABDOMEN: Soft, no ascites, no hepatosplenomegaly.  EXTREMITIES: No cyanosis, clubbing or edema       ASSESSMENT AND PLAN:  · Obstructive sleep apnea, patient is using the device with good compliance for treatment of sleep apnea.  I have reviewed the smart card down load and discussed with the patient the download data and encouarged the patient to continue to use the device.  The symptoms have improved and the residual AHI is acceptable.  The device is benefiting the patient and the device is medically necessary. The patient is also instructed to get the supplies from the [employer] and a prescription for supplies has been sent to the DME company.    · Obesity, patient's BMI is Body mass index is 34.52 kg/m²..  I have discussed weight reduction and the health benefits.  I have also discuss the relationship between the weight and sleep apnea and encouraged the patient to lose weight which is beneficial in treating sleep apnea. Discussed diet and exercise with the patient.    · Return to clinic in 12 months for follow-up        Clark Bland MD  Sleep Medicine.(Board-certified)  [facility]  Medical Director for [facility]  12/10/2020

## 2021-01-23 ENCOUNTER — HOSPITAL ENCOUNTER (OUTPATIENT)
Facility: SURGERY CENTER | Age: 56
Setting detail: HOSPITAL OUTPATIENT SURGERY
End: 2021-01-23
Attending: PLASTIC SURGERY | Admitting: PLASTIC SURGERY

## 2021-01-28 ENCOUNTER — TRANSCRIBE ORDERS (OUTPATIENT)
Dept: LAB | Facility: SURGERY CENTER | Age: 56
End: 2021-01-28

## 2021-01-28 DIAGNOSIS — Z01.818 OTHER SPECIFIED PRE-OPERATIVE EXAMINATION: Primary | ICD-10-CM

## 2021-02-20 ENCOUNTER — APPOINTMENT (OUTPATIENT)
Dept: LAB | Facility: SURGERY CENTER | Age: 56
End: 2021-02-20

## 2021-02-22 ENCOUNTER — APPOINTMENT (OUTPATIENT)
Dept: LAB | Facility: SURGERY CENTER | Age: 56
End: 2021-02-22

## 2021-09-02 ENCOUNTER — TELEPHONE (OUTPATIENT)
Dept: FAMILY MEDICINE CLINIC | Facility: CLINIC | Age: 56
End: 2021-09-02

## 2021-09-02 NOTE — TELEPHONE ENCOUNTER
Caller: Mukul Chahal    Relationship to patient: Self    Best call back number: [phone]    Patient is needing: PATIENT STATES SHE AND HER DAUGHTER WERE EXPOSED TO COVID-19 ON Sunday 08/29 AND IS NEEDING A NEGATIVE TEST BY Friday 09/03 TO RETURN TO SCHOOL AND FOR HER  TO RETURN TO WORK. PATIENT IS WANTING TO KNOW IF DR. ROLDAN KNOWS OF ANY LOCATIONS THAT PROVIDE SALIVA TESTING FOR COVID-19 AS SHE IS REFUSING TO HAVE HER OR HER DAUGHTER GO THROUGH [facility]

## 2021-12-09 ENCOUNTER — APPOINTMENT (OUTPATIENT)
Dept: SLEEP MEDICINE | Facility: HOSPITAL | Age: 56
End: 2021-12-09

## 2021-12-27 DIAGNOSIS — Z20.828 EXPOSURE TO INFLUENZA: Primary | ICD-10-CM

## 2021-12-27 RX ORDER — OSELTAMIVIR PHOSPHATE 75 MG/1
75 CAPSULE ORAL DAILY
Qty: 7 CAPSULE | Refills: 0 | Status: SHIPPED | OUTPATIENT
Start: 2021-12-27 | End: 2021-12-27 | Stop reason: SDUPTHER

## 2021-12-27 RX ORDER — OSELTAMIVIR PHOSPHATE 75 MG/1
75 CAPSULE ORAL DAILY
Qty: 7 CAPSULE | Refills: 0 | Status: SHIPPED | OUTPATIENT
Start: 2021-12-27 | End: 2022-01-03

## 2022-01-13 ENCOUNTER — TELEPHONE (OUTPATIENT)
Dept: FAMILY MEDICINE CLINIC | Facility: CLINIC | Age: 57
End: 2022-01-13

## 2022-01-13 ENCOUNTER — TELEMEDICINE (OUTPATIENT)
Dept: FAMILY MEDICINE CLINIC | Facility: CLINIC | Age: 57
End: 2022-01-13

## 2022-01-13 DIAGNOSIS — U07.1 COVID-19 VIRUS INFECTION: Primary | ICD-10-CM

## 2022-01-13 PROCEDURE — 99213 OFFICE O/P EST LOW 20 MIN: CPT | Performed by: FAMILY MEDICINE

## 2022-01-13 RX ORDER — IVERMECTIN 3 MG/1
TABLET ORAL
Qty: 5 TABLET | Refills: 0 | Status: SHIPPED | OUTPATIENT
Start: 2022-01-13

## 2022-01-13 NOTE — TELEPHONE ENCOUNTER
I would probably have them quarantine over the next 48 hours to see if they develop any symptoms and if not then no further need to stay home

## 2022-01-13 NOTE — TELEPHONE ENCOUNTER
Caller: Mukul Chahal    Relationship to patient: Self    Best call back number: [phone]    Date of positive COVID19 test: 1/12/22    Additional information or concerns: PATIENT WOULD LIKE TO KNOW WHAT HER FAMILY NEEDS TO DO ABOUT QUARANTINE.  HER  IS VACCINATED WITH 1 DOSE OF AMEE AND AMEE AND HER CHILDREN ARE NOT VACCINATED.  THEY ARE NOT SHOWING SYMPTOMS AT THIS TIME.       PLEASE CALL AND ADVISE

## 2022-01-13 NOTE — PROGRESS NOTES
Subjective   Mukul Chahal is a 56 y.o. female with No chief complaint on file.  .    History of Present Illness   56-year-old white female with consented video visit with 2-day history of congestion, frontal headache as well as fatigue and myalgias.  Patient has knowledge of a COVID exposure and did a home test which was positive.  She had hydroxychloroquine and ivermectin at home and has started both as of today.  The ivermectin she actually started yesterday and already feels improvement although she is using half the dose that she needs.  She denies chest pain, shortness of air or audible wheezing.  There has been no nausea/vomiting or diarrhea.  The following portions of the patient's history were reviewed and updated as appropriate: allergies, current medications, past family history, past medical history, past social history, past surgical history and problem list.    Review of Systems   Constitutional: Positive for fatigue. Negative for fever.   HENT: Positive for congestion. Negative for sore throat.    Respiratory: Negative for cough, shortness of breath and wheezing.    Cardiovascular: Negative for chest pain.   Musculoskeletal: Positive for myalgias.       Objective     There were no vitals filed for this visit.    No results found for this or any previous visit (from the past 672 hour(s)).    Physical Exam  Vitals and nursing note reviewed.   Constitutional:       Appearance: Normal appearance. She is well-developed and well-groomed.   HENT:      Head: Normocephalic and atraumatic.   Musculoskeletal:      Cervical back: Neck supple.   Skin:     General: Skin is warm and dry.      Findings: No rash.   Neurological:      Mental Status: She is alert and oriented to person, place, and time.   Psychiatric:         Attention and Perception: Attention and perception normal.         Mood and Affect: Mood and affect normal.         Speech: Speech normal.         Behavior: Behavior normal. Behavior is  cooperative.         Thought Content: Thought content normal.         Cognition and Memory: Cognition and memory normal.         Judgment: Judgment normal.         Assessment/Plan   Diagnoses and all orders for this visit:    1. COVID-19 virus infection (Primary)  -     ivermectin (STROMECTOL) 3 MG tablet tablet; 40 mg p.o. daily for 5 days, please compound  Dispense: 5 tablet; Refill: 0      Consented video visit required 15 minutes for completion.  Return if symptoms worsen or fail to improve.

## 2023-04-06 ENCOUNTER — OFFICE VISIT (OUTPATIENT)
Dept: SLEEP MEDICINE | Facility: HOSPITAL | Age: 58
End: 2023-04-06
Payer: COMMERCIAL

## 2023-04-06 VITALS
HEIGHT: 68 IN | WEIGHT: 227 LBS | HEART RATE: 84 BPM | BODY MASS INDEX: 34.4 KG/M2 | DIASTOLIC BLOOD PRESSURE: 83 MMHG | SYSTOLIC BLOOD PRESSURE: 150 MMHG | OXYGEN SATURATION: 97 %

## 2023-04-06 DIAGNOSIS — E66.09 CLASS 1 OBESITY DUE TO EXCESS CALORIES WITHOUT SERIOUS COMORBIDITY WITH BODY MASS INDEX (BMI) OF 34.0 TO 34.9 IN ADULT: ICD-10-CM

## 2023-04-06 DIAGNOSIS — G47.33 OSA ON CPAP: Primary | ICD-10-CM

## 2023-04-06 DIAGNOSIS — Z99.89 OSA ON CPAP: Primary | ICD-10-CM

## 2023-04-06 PROCEDURE — G0463 HOSPITAL OUTPT CLINIC VISIT: HCPCS

## 2023-04-06 PROCEDURE — 99213 OFFICE O/P EST LOW 20 MIN: CPT | Performed by: INTERNAL MEDICINE

## 2023-04-06 NOTE — PROGRESS NOTES
"  [facility]  [address]  [address]  [place] [address]  Phone   Fax       SLEEP CLINIC FOLLOW UP PROGRESS NOTE.    Mukul Chahal  [id number]   1965  57 y.o.  female      PCP: Anton Rubio DO      Date of visit: 4/6/2023    Chief Complaint   Patient presents with   • Sleep Apnea   • Obesity       HPI:  This is a 57 y.o. years old patient is here for the management of obstructive sleep apnea.  Sleep apnea is mild in severity with a AHI of 13/hr. Patient is using positive airway pressure therapy with auto CPAP.  Unfortunately is he not been able to use the CPAP because he has no supplies and also she recently got her replacement CPAP from [employer].  She did need supplies as soon as possible so that she can use the CPAP.  Without the CPAP her symptoms are getting worse    Medications and allergies are reviewed by me and documented in the encounter.     SOCIAL (habits pertaining to sleep medicine)  • History tobacco use:No   • History of alcohol use: 2 per week  • Caffeine use: 2     REVIEW OF SYSTEMS:   Pertaining positive symptoms are:  • [place] Sleepiness Scale :Total score: 8         PHYSICAL EXAMINATION:  CONSTITUTIONAL:  Vitals:    04/06/23 0900   BP: 150/83   Pulse: 84   SpO2: 97%   Weight: 103 kg (227 lb)   Height: 172.7 cm (67.99\")    Body mass index is 34.52 kg/m².   NOSE: nasal passages are clear, No deformities noted   RESP SYSTEM: Not in any respiratory distress, no chest deformities noted,   CARDIOVASULAR: No edema noted  NEURO: Oriented x 3, gait normal,  Mood and affect appeared appropriate      Data reviewed:  The Smart card downloaded on 4/6/2023 has been reviewed independently by me for compliance and discussed the data with the patient.   Needs supplies as soon as possible  Mask type: Nasal pillows  Device: [employer]  DME: [employer]      ASSESSMENT AND PLAN:  · Obstructive sleep apnea ( G 47.33).  She needs supplies as soon " as possible so that she can start using the CPAP.  She recently got her replacement from [employer]   .Without proper control of sleep apnea and good compliance there is a increased risk for hypertension, diabetes mellitus and nonrestorative sleep with hypersomnia which can increase risk for motor vehicle accidents.  Untreated sleep apnea is also a risk factor for development of atrial fibrillation, pulmonary hypertension, insulin resistance and stroke. The patient is also instructed to get the supplies from the [email or web address] and and change them on a regular basis.  A prescription for supplies has been sent to the [employer] company.  I have also discussed the good sleep hygiene habits and adequate amount of sleep needed for good health.  · Obesity  1 with BMI is Body mass index is 34.52 kg/m².. I have discuss the relationship between the weight and sleep apnea. The benefit of weight loss in reducing severity of sleep apnea was discussed. Discussed diet and exercise with the patient to achieve ideal BMI.   · Return in about 1 year (around 4/6/2024) for with smart card down load. . Patient's questions were answered.    4/6/2023  Clark Bland MD  Sleep Medicine.  Medical Director,   [facility], [place] and [place] [facility].

## 2023-09-12 ENCOUNTER — OFFICE VISIT (OUTPATIENT)
Dept: FAMILY MEDICINE CLINIC | Facility: CLINIC | Age: 58
End: 2023-09-12
Payer: COMMERCIAL

## 2023-09-12 VITALS
WEIGHT: 214 LBS | OXYGEN SATURATION: 98 % | SYSTOLIC BLOOD PRESSURE: 132 MMHG | HEART RATE: 72 BPM | BODY MASS INDEX: 32.43 KG/M2 | HEIGHT: 68 IN | DIASTOLIC BLOOD PRESSURE: 84 MMHG | TEMPERATURE: 97.7 F

## 2023-09-12 DIAGNOSIS — E11.9 CONTROLLED TYPE 2 DIABETES MELLITUS WITHOUT COMPLICATION, WITHOUT LONG-TERM CURRENT USE OF INSULIN: ICD-10-CM

## 2023-09-12 DIAGNOSIS — E78.2 MIXED HYPERLIPIDEMIA: ICD-10-CM

## 2023-09-12 DIAGNOSIS — M19.90 ARTHRITIS: ICD-10-CM

## 2023-09-12 DIAGNOSIS — E66.09 CLASS 1 OBESITY DUE TO EXCESS CALORIES WITHOUT SERIOUS COMORBIDITY WITH BODY MASS INDEX (BMI) OF 34.0 TO 34.9 IN ADULT: ICD-10-CM

## 2023-09-12 DIAGNOSIS — G89.29 CHRONIC LOW BACK PAIN WITHOUT SCIATICA, UNSPECIFIED BACK PAIN LATERALITY: ICD-10-CM

## 2023-09-12 DIAGNOSIS — L40.9 PSORIASIS OF SCALP: Primary | ICD-10-CM

## 2023-09-12 DIAGNOSIS — M54.50 CHRONIC LOW BACK PAIN WITHOUT SCIATICA, UNSPECIFIED BACK PAIN LATERALITY: ICD-10-CM

## 2023-09-12 PROCEDURE — 99213 OFFICE O/P EST LOW 20 MIN: CPT | Performed by: FAMILY MEDICINE

## 2023-09-12 RX ORDER — CLOBETASOL PROPIONATE 0.46 MG/ML
SOLUTION TOPICAL 2 TIMES DAILY
Qty: 50 ML | Refills: 2 | Status: SHIPPED | OUTPATIENT
Start: 2023-09-12

## 2023-09-12 NOTE — PROGRESS NOTES
Subjective   Mukul Chahal is a 57 y.o. female with   Chief Complaint   Patient presents with    Skin Lesion     scalp   .    History of Present Illness   57-year-old white female with raised erythematous scaly patches in her scalp that have been occurring off and on over the last 2 years.  1 lesion she scratched without the ability to see this and opened a large wound.  It eventually healed.  She has no other rash anywhere else on her body.  She has tried over-the-counter topical remedies without benefit.  No past history of same.  She does have arthritis as well as low back pain and is concerned that she may have underlying psoriatic arthritis.  The following portions of the patient's history were reviewed and updated as appropriate: allergies, current medications, past family history, past medical history, past social history, past surgical history and problem list.    Review of Systems   Musculoskeletal:  Positive for arthralgias and back pain.   Skin:  Positive for rash.     Objective     Vitals:    09/12/23 0830   BP: 132/84   Pulse: 72   Temp: 97.7 °F (36.5 °C)   SpO2: 98%       No results found for this or any previous visit (from the past 672 hour(s)).    Physical Exam  Vitals and nursing note reviewed.   Constitutional:       Appearance: Normal appearance. She is well-developed and well-groomed.   HENT:      Head: Normocephalic and atraumatic.   Musculoskeletal:      Cervical back: Neck supple.   Skin:     General: Skin is warm and dry.      Findings: Erythema and rash present.      Comments: 1 or 2 lesions of raised well-circumscribed erythematous patches with somewhat of a scaly surface.  These are found in the scalp and are somewhat difficult to assess.  Skin without other abnormality.   Neurological:      Mental Status: She is alert and oriented to person, place, and time.   Psychiatric:         Attention and Perception: Attention and perception normal.         Mood and Affect: Mood and affect  normal.         Speech: Speech normal.         Behavior: Behavior normal. Behavior is cooperative.         Thought Content: Thought content normal.         Cognition and Memory: Cognition and memory normal.         Judgment: Judgment normal.       Assessment & Plan   Diagnoses and all orders for this visit:    1. Psoriasis of scalp (Primary)  -     clobetasol (TEMOVATE) 0.05 % external solution; Apply  topically to the appropriate area as directed 2 (Two) Times a Day.  Dispense: 50 mL; Refill: 2  -     Hemoglobin A1c; Future  -     TSH; Future  -     Uric acid; Future  -     Comprehensive metabolic panel; Future  -     C-reactive protein; Future  -     Vitamin D 25 hydroxy; Future  -     Lipid panel; Future  -     Sedimentation rate, automated; Future  -     CBC w AUTO Differential; Future  -     ROSY; Future  -     Hepatitis C antibody; Future  -     Rheumatoid Factor, Quant; Future    2. Mixed hyperlipidemia  -     Hemoglobin A1c; Future  -     TSH; Future  -     Uric acid; Future  -     Comprehensive metabolic panel; Future  -     C-reactive protein; Future  -     Vitamin D 25 hydroxy; Future  -     Lipid panel; Future  -     Sedimentation rate, automated; Future  -     CBC w AUTO Differential; Future  -     ROSY; Future  -     Hepatitis C antibody; Future  -     Rheumatoid Factor, Quant; Future    3. Controlled type 2 diabetes mellitus without complication, without long-term current use of insulin  -     Hemoglobin A1c; Future  -     TSH; Future  -     Uric acid; Future  -     Comprehensive metabolic panel; Future  -     C-reactive protein; Future  -     Vitamin D 25 hydroxy; Future  -     Lipid panel; Future  -     Sedimentation rate, automated; Future  -     CBC w AUTO Differential; Future  -     ROSY; Future  -     Hepatitis C antibody; Future  -     Rheumatoid Factor, Quant; Future    4. Class 1 obesity due to excess calories without serious comorbidity with body mass index (BMI) of 34.0 to 34.9 in adult  -      Hemoglobin A1c; Future  -     TSH; Future  -     Uric acid; Future  -     Comprehensive metabolic panel; Future  -     C-reactive protein; Future  -     Vitamin D 25 hydroxy; Future  -     Lipid panel; Future  -     Sedimentation rate, automated; Future  -     CBC w AUTO Differential; Future  -     ROSY; Future  -     Hepatitis C antibody; Future  -     Rheumatoid Factor, Quant; Future    5. Arthritis  -     Hemoglobin A1c; Future  -     TSH; Future  -     Uric acid; Future  -     Comprehensive metabolic panel; Future  -     C-reactive protein; Future  -     Vitamin D 25 hydroxy; Future  -     Lipid panel; Future  -     Sedimentation rate, automated; Future  -     CBC w AUTO Differential; Future  -     ROSY; Future  -     Hepatitis C antibody; Future  -     Rheumatoid Factor, Quant; Future    6. Chronic low back pain without sciatica, unspecified back pain laterality  -     Hemoglobin A1c; Future  -     TSH; Future  -     Uric acid; Future  -     Comprehensive metabolic panel; Future  -     C-reactive protein; Future  -     Vitamin D 25 hydroxy; Future  -     Lipid panel; Future  -     Sedimentation rate, automated; Future  -     CBC w AUTO Differential; Future  -     ROSY; Future  -     Hepatitis C antibody; Future  -     Rheumatoid Factor, Quant; Future        Return in about 4 weeks (around 10/10/2023) for Recheck.

## 2023-09-13 ENCOUNTER — TELEPHONE (OUTPATIENT)
Dept: FAMILY MEDICINE CLINIC | Facility: CLINIC | Age: 58
End: 2023-09-13

## 2023-09-13 NOTE — TELEPHONE ENCOUNTER
"  Caller: Mukul Chahal \"Katiuska Sage\"    Relationship: Self    Best call back number:     What is the best time to reach you:     Who are you requesting to speak with (clinical staff, provider,  specific staff member):     Do you know the name of the person who called:     What was the call regarding: PATIENT IS CALLING IN TO REQUEST A CALL BACK FROM DR ROLDAN OR STAFF AS SHE WANTS RECOMMENDATIONS ON A DERMATOLOGIST FOR HER DAUGHTER.     Is it okay if the provider responds through MyChart:         "

## 2023-12-18 ENCOUNTER — OFFICE VISIT (OUTPATIENT)
Dept: FAMILY MEDICINE CLINIC | Facility: CLINIC | Age: 58
End: 2023-12-18
Payer: COMMERCIAL

## 2023-12-18 VITALS
WEIGHT: 214 LBS | OXYGEN SATURATION: 96 % | HEIGHT: 68 IN | TEMPERATURE: 97.7 F | SYSTOLIC BLOOD PRESSURE: 134 MMHG | BODY MASS INDEX: 32.43 KG/M2 | HEART RATE: 101 BPM | DIASTOLIC BLOOD PRESSURE: 84 MMHG

## 2023-12-18 DIAGNOSIS — N90.7 SEBACEOUS CYST OF LABIA: ICD-10-CM

## 2023-12-18 DIAGNOSIS — B37.31 MONILIAL VAGINITIS: ICD-10-CM

## 2023-12-18 DIAGNOSIS — L40.9 PSORIASIS OF SCALP: Primary | ICD-10-CM

## 2023-12-18 PROCEDURE — 99214 OFFICE O/P EST MOD 30 MIN: CPT | Performed by: FAMILY MEDICINE

## 2023-12-18 RX ORDER — CLOBETASOL PROPIONATE 0.46 MG/ML
SOLUTION TOPICAL 2 TIMES DAILY
Qty: 50 ML | Refills: 5 | Status: SHIPPED | OUTPATIENT
Start: 2023-12-18

## 2023-12-19 NOTE — PROGRESS NOTES
Subjective   Mukul Chahal is a 58 y.o. female with   Chief Complaint   Patient presents with    Abrasion     On scalp     .    Abrasion  Associated symptoms include a rash.      58-year-old white female with known history of psoriasis of the scalp here for reevaluation.  Patient has been successfully using Temovate external solution with success.  She will need refill of same.  She does not have psoriasis on any other part of the body.  She has labs pending for next draw to include rheumatologic laboratory studies in regards to intermittent low back pain and the potential to have psoriatic arthritis.  This however has not been diagnosed to date.  She also complains of an inflamed subcutaneous mass in the right labial area with generalized erythema and pruritus of the labia in general.  The mass however seems to have improved with time and although she can still palpate this it is somewhat difficult to find it.  She denies intravaginal pruritus and there has been no overt discharge.  The following portions of the patient's history were reviewed and updated as appropriate: allergies, current medications, past family history, past medical history, past social history, past surgical history and problem list.    Review of Systems   Skin:  Positive for rash.        Right labial subcutaneous mass       Objective     Vitals:    12/18/23 1413   BP: 134/84   Pulse: 101   Temp: 97.7 °F (36.5 °C)   SpO2: 96%       No results found for this or any previous visit (from the past 672 hour(s)).    Physical Exam  Vitals and nursing note reviewed.   Constitutional:       Appearance: Normal appearance. She is well-developed and well-groomed.   HENT:      Head: Normocephalic and atraumatic.   Musculoskeletal:      Cervical back: Neck supple.   Skin:     General: Skin is warm and dry.      Findings: Rash present.      Comments: Scalp with small erythematous patches with silver scale.  Labia in general with increased erythema without  overt discharge.  To the right of the right labia within the perineal body is a small palpable subcutaneous mass without inflammation, erythema or induration.  This is consistent with a sebaceous cyst.   Neurological:      Mental Status: She is alert and oriented to person, place, and time.   Psychiatric:         Attention and Perception: Attention and perception normal.         Mood and Affect: Mood and affect normal.         Speech: Speech normal.         Behavior: Behavior normal. Behavior is cooperative.         Thought Content: Thought content normal.         Cognition and Memory: Cognition and memory normal.         Judgment: Judgment normal.         Assessment & Plan   Diagnoses and all orders for this visit:    1. Psoriasis of scalp (Primary)  -     clobetasol (TEMOVATE) 0.05 % external solution; Apply  topically to the appropriate area as directed 2 (Two) Times a Day.  Dispense: 50 mL; Refill: 5    2. Monilial vaginitis  -     terconazole (TERAZOL 7) 0.4 % vaginal cream; Insert 1 applicator into the vagina Every Night.  Dispense: 45 g; Refill: 0    3. Sebaceous cyst of labia    Patient reassured in regards to the sebaceous cyst since this is noninflamed further intervention will be unnecessary.  Terazole cream will be applied manually in the perilabial region.  Refill of Temovate has taken place.  Follow-up will be in 6 months or sooner if needed.    Return in about 6 months (around 6/18/2024), or if symptoms worsen or fail to improve, for Recheck.

## 2024-11-20 DIAGNOSIS — E66.811 CLASS 1 OBESITY DUE TO EXCESS CALORIES WITHOUT SERIOUS COMORBIDITY WITH BODY MASS INDEX (BMI) OF 34.0 TO 34.9 IN ADULT: Primary | ICD-10-CM

## 2024-11-20 DIAGNOSIS — E11.9 CONTROLLED TYPE 2 DIABETES MELLITUS WITHOUT COMPLICATION, WITHOUT LONG-TERM CURRENT USE OF INSULIN: ICD-10-CM

## 2024-11-20 DIAGNOSIS — Z13.21 ENCOUNTER FOR VITAMIN DEFICIENCY SCREENING: ICD-10-CM

## 2024-11-20 DIAGNOSIS — Z13.29 SCREENING FOR THYROID DISORDER: ICD-10-CM

## 2024-11-20 DIAGNOSIS — Z00.00 ANNUAL PHYSICAL EXAM: ICD-10-CM

## 2024-11-20 DIAGNOSIS — F41.8 DEPRESSION WITH ANXIETY: ICD-10-CM

## 2024-11-20 DIAGNOSIS — E66.09 CLASS 1 OBESITY DUE TO EXCESS CALORIES WITHOUT SERIOUS COMORBIDITY WITH BODY MASS INDEX (BMI) OF 34.0 TO 34.9 IN ADULT: Primary | ICD-10-CM

## 2024-11-20 DIAGNOSIS — E78.2 MIXED HYPERLIPIDEMIA: ICD-10-CM

## 2024-12-03 ENCOUNTER — TELEPHONE (OUTPATIENT)
Dept: FAMILY MEDICINE CLINIC | Facility: CLINIC | Age: 59
End: 2024-12-03
Payer: MEDICAID

## 2024-12-03 DIAGNOSIS — M12.9 ARTHRITIS, MULTIPLE JOINT INVOLVEMENT: Primary | ICD-10-CM

## 2024-12-04 ENCOUNTER — LAB (OUTPATIENT)
Dept: LAB | Facility: HOSPITAL | Age: 59
End: 2024-12-04
Payer: MEDICAID

## 2024-12-04 DIAGNOSIS — M12.9 ARTHRITIS, MULTIPLE JOINT INVOLVEMENT: ICD-10-CM

## 2024-12-04 LAB
25(OH)D3 SERPL-MCNC: 41.2 NG/ML (ref 30–100)
ALBUMIN SERPL-MCNC: 4.3 G/DL (ref 3.5–5.2)
ALBUMIN UR-MCNC: <1.2 MG/DL
ALBUMIN/GLOB SERPL: 1.2 G/DL
ALP SERPL-CCNC: 147 U/L (ref 39–117)
ALT SERPL W P-5'-P-CCNC: 40 U/L (ref 1–33)
ANION GAP SERPL CALCULATED.3IONS-SCNC: 9.7 MMOL/L (ref 5–15)
AST SERPL-CCNC: 21 U/L (ref 1–32)
BASOPHILS # BLD AUTO: 0.06 10*3/MM3 (ref 0–0.2)
BASOPHILS NFR BLD AUTO: 0.8 % (ref 0–1.5)
BILIRUB SERPL-MCNC: 0.5 MG/DL (ref 0–1.2)
BILIRUB UR QL STRIP: NEGATIVE
BUN SERPL-MCNC: 17 MG/DL (ref 6–20)
BUN/CREAT SERPL: 39.5 (ref 7–25)
CALCIUM SPEC-SCNC: 9.3 MG/DL (ref 8.6–10.5)
CHLORIDE SERPL-SCNC: 99 MMOL/L (ref 98–107)
CHOLEST SERPL-MCNC: 256 MG/DL (ref 0–200)
CHROMATIN AB SERPL-ACNC: 10.4 IU/ML (ref 0–14)
CLARITY UR: CLEAR
CO2 SERPL-SCNC: 27.3 MMOL/L (ref 22–29)
COLOR UR: YELLOW
CREAT SERPL-MCNC: 0.43 MG/DL (ref 0.57–1)
CREAT UR-MCNC: 30.9 MG/DL
CRP SERPL-MCNC: 0.74 MG/DL (ref 0–0.5)
DEPRECATED RDW RBC AUTO: 38.2 FL (ref 37–54)
EGFRCR SERPLBLD CKD-EPI 2021: 112.2 ML/MIN/1.73
EOSINOPHIL # BLD AUTO: 0.25 10*3/MM3 (ref 0–0.4)
EOSINOPHIL NFR BLD AUTO: 3.5 % (ref 0.3–6.2)
ERYTHROCYTE [DISTWIDTH] IN BLOOD BY AUTOMATED COUNT: 11.8 % (ref 12.3–15.4)
ERYTHROCYTE [SEDIMENTATION RATE] IN BLOOD: 7 MM/HR (ref 0–30)
GLOBULIN UR ELPH-MCNC: 3.7 GM/DL
GLUCOSE SERPL-MCNC: 364 MG/DL (ref 65–99)
GLUCOSE UR STRIP-MCNC: ABNORMAL MG/DL
HBA1C MFR BLD: 14 % (ref 4.8–5.6)
HCT VFR BLD AUTO: 46.2 % (ref 34–46.6)
HDLC SERPL-MCNC: 47 MG/DL (ref 40–60)
HGB BLD-MCNC: 15.6 G/DL (ref 12–15.9)
HGB UR QL STRIP.AUTO: NEGATIVE
IMM GRANULOCYTES # BLD AUTO: 0.02 10*3/MM3 (ref 0–0.05)
IMM GRANULOCYTES NFR BLD AUTO: 0.3 % (ref 0–0.5)
KETONES UR QL STRIP: ABNORMAL
LDLC SERPL CALC-MCNC: 147 MG/DL (ref 0–100)
LDLC/HDLC SERPL: 3.01 {RATIO}
LEUKOCYTE ESTERASE UR QL STRIP.AUTO: NEGATIVE
LYMPHOCYTES # BLD AUTO: 2.26 10*3/MM3 (ref 0.7–3.1)
LYMPHOCYTES NFR BLD AUTO: 31.5 % (ref 19.6–45.3)
MCH RBC QN AUTO: 30 PG (ref 26.6–33)
MCHC RBC AUTO-ENTMCNC: 33.8 G/DL (ref 31.5–35.7)
MCV RBC AUTO: 88.8 FL (ref 79–97)
MICROALBUMIN/CREAT UR: NORMAL MG/G{CREAT}
MONOCYTES # BLD AUTO: 0.54 10*3/MM3 (ref 0.1–0.9)
MONOCYTES NFR BLD AUTO: 7.5 % (ref 5–12)
NEUTROPHILS NFR BLD AUTO: 4.05 10*3/MM3 (ref 1.7–7)
NEUTROPHILS NFR BLD AUTO: 56.4 % (ref 42.7–76)
NITRITE UR QL STRIP: NEGATIVE
NRBC BLD AUTO-RTO: 0 /100 WBC (ref 0–0.2)
PH UR STRIP.AUTO: 6.5 [PH] (ref 5–8)
PLATELET # BLD AUTO: 336 10*3/MM3 (ref 140–450)
PMV BLD AUTO: 9.6 FL (ref 6–12)
POTASSIUM SERPL-SCNC: 5 MMOL/L (ref 3.5–5.2)
PROT SERPL-MCNC: 8 G/DL (ref 6–8.5)
PROT UR QL STRIP: NEGATIVE
RBC # BLD AUTO: 5.2 10*6/MM3 (ref 3.77–5.28)
SODIUM SERPL-SCNC: 136 MMOL/L (ref 136–145)
SP GR UR STRIP: 1.01 (ref 1–1.03)
TRIGL SERPL-MCNC: 338 MG/DL (ref 0–150)
TSH SERPL DL<=0.05 MIU/L-ACNC: 1.24 UIU/ML (ref 0.27–4.2)
URATE SERPL-MCNC: 3.4 MG/DL (ref 2.4–5.7)
UROBILINOGEN UR QL STRIP: ABNORMAL
VLDLC SERPL-MCNC: 62 MG/DL (ref 5–40)
WBC NRBC COR # BLD AUTO: 7.18 10*3/MM3 (ref 3.4–10.8)

## 2024-12-04 PROCEDURE — 80061 LIPID PANEL: CPT | Performed by: FAMILY MEDICINE

## 2024-12-04 PROCEDURE — 86140 C-REACTIVE PROTEIN: CPT

## 2024-12-04 PROCEDURE — 82306 VITAMIN D 25 HYDROXY: CPT | Performed by: FAMILY MEDICINE

## 2024-12-04 PROCEDURE — 85652 RBC SED RATE AUTOMATED: CPT

## 2024-12-04 PROCEDURE — 80050 GENERAL HEALTH PANEL: CPT | Performed by: FAMILY MEDICINE

## 2024-12-04 PROCEDURE — 84550 ASSAY OF BLOOD/URIC ACID: CPT

## 2024-12-04 PROCEDURE — 86038 ANTINUCLEAR ANTIBODIES: CPT

## 2024-12-04 PROCEDURE — 83036 HEMOGLOBIN GLYCOSYLATED A1C: CPT | Performed by: FAMILY MEDICINE

## 2024-12-04 PROCEDURE — 82570 ASSAY OF URINE CREATININE: CPT | Performed by: FAMILY MEDICINE

## 2024-12-04 PROCEDURE — 86431 RHEUMATOID FACTOR QUANT: CPT

## 2024-12-04 PROCEDURE — 82043 UR ALBUMIN QUANTITATIVE: CPT | Performed by: FAMILY MEDICINE

## 2024-12-04 PROCEDURE — 81003 URINALYSIS AUTO W/O SCOPE: CPT | Performed by: FAMILY MEDICINE

## 2024-12-05 LAB — ANA SER QL: NEGATIVE

## 2024-12-09 ENCOUNTER — OFFICE VISIT (OUTPATIENT)
Dept: FAMILY MEDICINE CLINIC | Facility: CLINIC | Age: 59
End: 2024-12-09
Payer: MEDICAID

## 2024-12-09 VITALS
OXYGEN SATURATION: 97 % | TEMPERATURE: 97.5 F | DIASTOLIC BLOOD PRESSURE: 84 MMHG | HEIGHT: 68 IN | BODY MASS INDEX: 30.92 KG/M2 | SYSTOLIC BLOOD PRESSURE: 120 MMHG | HEART RATE: 80 BPM | WEIGHT: 204 LBS

## 2024-12-09 DIAGNOSIS — L40.9 PSORIASIS OF SCALP: ICD-10-CM

## 2024-12-09 DIAGNOSIS — G47.33 OSA ON CPAP: ICD-10-CM

## 2024-12-09 DIAGNOSIS — E66.09 EXOGENOUS OBESITY: ICD-10-CM

## 2024-12-09 DIAGNOSIS — Z00.01 ENCOUNTER FOR WELL ADULT EXAM WITH ABNORMAL FINDINGS: Primary | ICD-10-CM

## 2024-12-09 DIAGNOSIS — E11.9 TYPE 2 DIABETES MELLITUS WITHOUT COMPLICATION, WITHOUT LONG-TERM CURRENT USE OF INSULIN: ICD-10-CM

## 2024-12-09 DIAGNOSIS — E78.2 MIXED HYPERLIPIDEMIA: ICD-10-CM

## 2024-12-09 PROCEDURE — 1159F MED LIST DOCD IN RCRD: CPT | Performed by: FAMILY MEDICINE

## 2024-12-09 PROCEDURE — 99396 PREV VISIT EST AGE 40-64: CPT | Performed by: FAMILY MEDICINE

## 2024-12-09 PROCEDURE — 3046F HEMOGLOBIN A1C LEVEL >9.0%: CPT | Performed by: FAMILY MEDICINE

## 2024-12-09 PROCEDURE — 1160F RVW MEDS BY RX/DR IN RCRD: CPT | Performed by: FAMILY MEDICINE

## 2024-12-09 PROCEDURE — 1126F AMNT PAIN NOTED NONE PRSNT: CPT | Performed by: FAMILY MEDICINE

## 2024-12-09 RX ORDER — METFORMIN HYDROCHLORIDE 750 MG/1
TABLET, EXTENDED RELEASE ORAL
Qty: 60 TABLET | Refills: 5 | Status: SHIPPED | OUTPATIENT
Start: 2024-12-09

## 2024-12-09 RX ORDER — CLOBETASOL PROPIONATE 0.5 MG/ML
SOLUTION TOPICAL 2 TIMES DAILY
Qty: 50 ML | Refills: 5 | Status: SHIPPED | OUTPATIENT
Start: 2024-12-09

## 2024-12-09 RX ORDER — ROSUVASTATIN CALCIUM 10 MG/1
10 TABLET, COATED ORAL DAILY
Qty: 90 TABLET | Refills: 1 | Status: SHIPPED | OUTPATIENT
Start: 2024-12-09

## 2024-12-09 RX ORDER — HYDROCHLOROTHIAZIDE 12.5 MG/1
CAPSULE ORAL
Qty: 2 EACH | Refills: 5 | Status: SHIPPED | OUTPATIENT
Start: 2024-12-09

## 2024-12-10 ENCOUNTER — TELEPHONE (OUTPATIENT)
Dept: FAMILY MEDICINE CLINIC | Facility: CLINIC | Age: 59
End: 2024-12-10

## 2024-12-10 NOTE — TELEPHONE ENCOUNTER
"  Caller: Mukul Chahal \"Katiuska Sage\"    Relationship: Self    Best call back number: [phone]       Who are you requesting to speak with (clinical staff, provider,  specific staff member): DR ROLDAN    What was the call regarding: PATIENT STATES WHAT IS THE NEXT STEP SINCE MOUNJARO WAS DENIED     Is it okay if the provider responds through MyChart: YES    "

## 2024-12-10 NOTE — TELEPHONE ENCOUNTER
Mounjaro was denied by patient's insurance. Patient is asking what the next step is? Please advise.

## 2024-12-10 NOTE — PROGRESS NOTES
Subjective   Mukul Chahal is a 59 y.o. female with   Chief Complaint   Patient presents with    Annual Exam   .    History of Present Illness   59-year-old white female here for routine complete physical exam.  Patient with long history of exogenous obesity as well as hyperlipidemia, [place] and has a past history of depression with anxiety features.  She is currently using clobetasol for psoriasis of the scalp otherwise is on no prescriptive medications.  She does have a positive family history of type II non-insulin-dependent diabetes mellitus and has had fasting labs prior to this appointment.  Patient has not had colonoscopy in the past.  The following portions of the patient's history were reviewed and updated as appropriate: allergies, current medications, past family history, past medical history, past social history, past surgical history and problem list.    Review of Systems   Endocrine:        Exogenous obesity   Skin:  Positive for rash (Scalp psoriasis).   All other systems reviewed and are negative.      Objective     Vitals:    12/09/24 1520   BP: 120/84   Pulse: 80   Temp: 97.5 °F (36.4 °C)   SpO2: 97%       Recent Results (from the past 4 weeks)   Comprehensive Metabolic Panel    Collection Time: 12/04/24  8:11 AM    Specimen: Blood   Result Value Ref Range    Glucose 364 (H) 65 - 99 mg/dL    BUN 17 6 - 20 mg/dL    Creatinine 0.43 (L) 0.57 - 1.00 mg/dL    Sodium 136 136 - 145 mmol/L    Potassium 5.0 3.5 - 5.2 mmol/L    Chloride 99 98 - 107 mmol/L    CO2 27.3 22.0 - 29.0 mmol/L    Calcium 9.3 8.6 - 10.5 mg/dL    Total Protein 8.0 6.0 - 8.5 g/dL    Albumin 4.3 3.5 - 5.2 g/dL    ALT (SGPT) 40 (H) 1 - 33 U/L    AST (SGOT) 21 1 - 32 U/L    Alkaline Phosphatase 147 (H) 39 - 117 U/L    Total Bilirubin 0.5 0.0 - 1.2 mg/dL    Globulin 3.7 gm/dL    A/G Ratio 1.2 g/dL    BUN/Creatinine Ratio 39.5 (H) 7.0 - 25.0    Anion Gap 9.7 5.0 - 15.0 mmol/L    eGFR 112.2 >60.0 mL/min/1.73   TSH    Collection Time:  12/04/24  8:11 AM    Specimen: Blood   Result Value Ref Range    TSH 1.240 0.270 - 4.200 uIU/mL   Vitamin D,25-Hydroxy    Collection Time: 12/04/24  8:11 AM    Specimen: Blood   Result Value Ref Range    25 Hydroxy, Vitamin D 41.2 30.0 - 100.0 ng/ml   Lipid Panel    Collection Time: 12/04/24  8:11 AM    Specimen: Blood   Result Value Ref Range    Total Cholesterol 256 (H) 0 - 200 mg/dL    Triglycerides 338 (H) 0 - 150 mg/dL    HDL Cholesterol 47 40 - 60 mg/dL    LDL Cholesterol  147 (H) 0 - 100 mg/dL    VLDL Cholesterol 62 (H) 5 - 40 mg/dL    LDL/HDL Ratio 3.01    Urinalysis With Microscopic If Indicated (No Culture) - Urine, Clean Catch    Collection Time: 12/04/24  8:11 AM    Specimen: Urine, Clean Catch   Result Value Ref Range    Color, UA Yellow Yellow, Straw    Appearance, UA Clear Clear    pH, UA 6.5 5.0 - 8.0    Specific Gravity, UA 1.015 1.005 - 1.030    Glucose,  mg/dL (2+) (A) Negative    Ketones, UA 15 mg/dL (1+) (A) Negative    Bilirubin, UA Negative Negative    Blood, UA Negative Negative    Protein, UA Negative Negative    Leuk Esterase, UA Negative Negative    Nitrite, UA Negative Negative    Urobilinogen, UA 0.2 E.U./dL 0.2 - 1.0 E.U./dL   Hemoglobin A1c    Collection Time: 12/04/24  8:11 AM    Specimen: Blood   Result Value Ref Range    Hemoglobin A1C 14.00 (H) 4.80 - 5.60 %   Microalbumin / Creatinine Urine Ratio - Urine, Clean Catch    Collection Time: 12/04/24  8:11 AM    Specimen: Urine, Clean Catch   Result Value Ref Range    Microalbumin/Creatinine Ratio      Creatinine, Urine 30.9 mg/dL    Microalbumin, Urine <1.2 mg/dL   Rheumatoid Factor, Quant    Collection Time: 12/04/24  8:11 AM    Specimen: Blood   Result Value Ref Range    Rheumatoid Factor Quantitative 10.4 0.0 - 14.0 IU/mL   C-reactive protein    Collection Time: 12/04/24  8:11 AM    Specimen: Blood   Result Value Ref Range    C-Reactive Protein 0.74 (H) 0.00 - 0.50 mg/dL   Uric acid    Collection Time: 12/04/24  8:11 AM     Specimen: Blood   Result Value Ref Range    Uric Acid 3.4 2.4 - 5.7 mg/dL   Sedimentation rate, automated    Collection Time: 12/04/24  8:11 AM    Specimen: Blood   Result Value Ref Range    Sed Rate 7 0 - 30 mm/hr   ROSY    Collection Time: 12/04/24  8:11 AM    Specimen: Blood   Result Value Ref Range    ROSY Direct Negative Negative   CBC Auto Differential    Collection Time: 12/04/24  8:11 AM    Specimen: Blood   Result Value Ref Range    WBC 7.18 3.40 - 10.80 10*3/mm3    RBC 5.20 3.77 - 5.28 10*6/mm3    Hemoglobin 15.6 12.0 - 15.9 g/dL    Hematocrit 46.2 34.0 - 46.6 %    MCV 88.8 79.0 - 97.0 fL    MCH 30.0 26.6 - 33.0 pg    MCHC 33.8 31.5 - 35.7 g/dL    RDW 11.8 (L) 12.3 - 15.4 %    RDW-SD 38.2 37.0 - 54.0 fl    MPV 9.6 6.0 - 12.0 fL    Platelets 336 140 - 450 10*3/mm3    Neutrophil % 56.4 42.7 - 76.0 %    Lymphocyte % 31.5 19.6 - 45.3 %    Monocyte % 7.5 5.0 - 12.0 %    Eosinophil % 3.5 0.3 - 6.2 %    Basophil % 0.8 0.0 - 1.5 %    Immature Grans % 0.3 0.0 - 0.5 %    Neutrophils, Absolute 4.05 1.70 - 7.00 10*3/mm3    Lymphocytes, Absolute 2.26 0.70 - 3.10 10*3/mm3    Monocytes, Absolute 0.54 0.10 - 0.90 10*3/mm3    Eosinophils, Absolute 0.25 0.00 - 0.40 10*3/mm3    Basophils, Absolute 0.06 0.00 - 0.20 10*3/mm3    Immature Grans, Absolute 0.02 0.00 - 0.05 10*3/mm3    nRBC 0.0 0.0 - 0.2 /100 WBC       Physical Exam  Vitals and nursing note reviewed.   Constitutional:       General: She is not in acute distress.     Appearance: Normal appearance. She is well-developed and well-groomed.   HENT:      Head: Normocephalic and atraumatic.      Right Ear: Hearing, tympanic membrane, ear canal and external ear normal.      Left Ear: Hearing, tympanic membrane, ear canal and external ear normal.      Nose: Nose normal.      Mouth/Throat:      Lips: Pink.      Mouth: Mucous membranes are moist.      Dentition: Normal dentition.      Tongue: No lesions. Tongue does not deviate from midline.      Palate: No mass and lesions.       Pharynx: Oropharynx is clear. Uvula midline.   Eyes:      General: Lids are normal. No scleral icterus.     Extraocular Movements: Extraocular movements intact.      Conjunctiva/sclera: Conjunctivae normal.      Pupils: Pupils are equal, round, and reactive to light.      Funduscopic exam:     Right eye: No hemorrhage, exudate, AV nicking or papilledema.         Left eye: No hemorrhage, exudate, AV nicking or papilledema.   Neck:      Thyroid: No thyroid mass or thyromegaly.      Vascular: No carotid bruit or JVD.      Trachea: Trachea normal.   Cardiovascular:      Rate and Rhythm: Normal rate and regular rhythm.      Chest Wall: PMI is not displaced.      Pulses: Normal pulses.           Carotid pulses are 2+ on the right side and 2+ on the left side.       Radial pulses are 2+ on the right side and 2+ on the left side.      Heart sounds: Normal heart sounds, S1 normal and S2 normal. No murmur heard.     No friction rub. No gallop.   Pulmonary:      Effort: Pulmonary effort is normal.      Breath sounds: Normal breath sounds. No decreased breath sounds, wheezing, rhonchi or rales.   Abdominal:      General: Abdomen is flat. Bowel sounds are normal. There is no distension.      Palpations: Abdomen is soft. Abdomen is not rigid. There is no hepatomegaly, splenomegaly or mass.      Tenderness: There is no abdominal tenderness. There is no right CVA tenderness, left CVA tenderness, guarding or rebound.      Hernia: No hernia is present.   Musculoskeletal:         General: No tenderness or deformity. Normal range of motion.      Cervical back: Normal range of motion and neck supple. No muscular tenderness. Normal range of motion.   Lymphadenopathy:      Cervical: No cervical adenopathy.   Skin:     General: Skin is warm and dry.      Findings: No rash.   Neurological:      Mental Status: She is alert and oriented to person, place, and time.      Cranial Nerves: No cranial nerve deficit.      Sensory: Sensation is  intact. No sensory deficit.      Motor: Motor function is intact. No tremor or abnormal muscle tone.      Coordination: Coordination is intact. Coordination normal.      Gait: Gait is intact. Gait normal.      Deep Tendon Reflexes: Reflexes are normal and symmetric. Reflexes normal.      Reflex Scores:       Bicep reflexes are 2+ on the right side and 2+ on the left side.       Brachioradialis reflexes are 2+ on the right side and 2+ on the left side.       Patellar reflexes are 2+ on the right side and 2+ on the left side.  Psychiatric:         Attention and Perception: Attention and perception normal.         Mood and Affect: Mood and affect normal.         Speech: Speech normal.         Behavior: Behavior normal. Behavior is cooperative.         Thought Content: Thought content normal.         Cognition and Memory: Cognition and memory normal.         Judgment: Judgment normal.         Assessment & Plan   Diagnoses and all orders for this visit:    1. Encounter for well adult exam with abnormal findings (Primary)    2. Type 2 diabetes mellitus without complication, without long-term current use of insulin  -     Tirzepatide 2.5 MG/0.5ML solution auto-injector; Inject 2.5 mg under the skin into the appropriate area as directed 1 (One) Time Per Week.  Dispense: 2 mL; Refill: 0  -     metFORMIN ER (GLUCOPHAGE-XR) 750 MG 24 hr tablet; 2 p.o. daily  Dispense: 60 tablet; Refill: 5  -     Continuous Glucose Sensor (FreeStyle Courtney 3 Plus Sensor); Use Every 15 (Fifteen) Days.  Dispense: 2 each; Refill: 5  -     Ambulatory Referral to Diabetic Education    3. Mixed hyperlipidemia  -     rosuvastatin (Crestor) 10 MG tablet; Take 1 tablet by mouth Daily.  Dispense: 90 tablet; Refill: 1    4. Exogenous obesity    5. Psoriasis of scalp  -     clobetasol (TEMOVATE) 0.05 % external solution; Apply  topically to the appropriate area as directed 2 (Two) Times a Day.  Dispense: 50 mL; Refill: 5    6. [place] on CPAP    Patient is a  newly diagnosed type II diabetic with medications initiated as above.  She also has exogenous obesity and we are in hopes that becomes better controlled with the above medication and with weight loss.  Rosuvastatin will also be initiated with an LDL goal of 70 or less.  Patient has been advised to wear seatbelts while driving and a bicycle helmet while riding a bicycle.  Anticipate follow-up in 4 weeks with jean paul 3+ numbers.    Return in about 4 weeks (around 1/6/2025) for Recheck.  BMI is >= 30 and <35. (Class 1 Obesity). The following options were offered after discussion;: exercise counseling/recommendations and nutrition counseling/recommendations

## 2024-12-12 DIAGNOSIS — E11.9 TYPE 2 DIABETES MELLITUS WITHOUT COMPLICATION, WITHOUT LONG-TERM CURRENT USE OF INSULIN: Primary | ICD-10-CM

## 2024-12-12 RX ORDER — SEMAGLUTIDE 0.68 MG/ML
0.25 INJECTION, SOLUTION SUBCUTANEOUS WEEKLY
Qty: 3 ML | Refills: 0 | Status: SHIPPED | OUTPATIENT
Start: 2024-12-12

## 2024-12-20 ENCOUNTER — TELEPHONE (OUTPATIENT)
Dept: FAMILY MEDICINE CLINIC | Facility: CLINIC | Age: 59
End: 2024-12-20

## 2024-12-20 NOTE — TELEPHONE ENCOUNTER
"  Caller: Mukul Chahal \"Katiuska Sage\"    Relationship: Self    Best call back number: [phone]      What was the call regarding: PATIENT CALLING WANTED TO FOLLOW UP WITH A MEDICATION SHE STATES DR ROLDAN WAS SENDING IN FOR HER SINCE THE MOUNJARO DENIED     PATIENT DIDN'T KNOW THE NAME OF THE MEDICATION BUT WOULD LIKE AN UPDATE     PLEASE GIVE CALLBACK       "

## 2025-01-22 ENCOUNTER — OFFICE VISIT (OUTPATIENT)
Dept: FAMILY MEDICINE CLINIC | Facility: CLINIC | Age: 60
End: 2025-01-22
Payer: MEDICAID

## 2025-01-22 VITALS
BODY MASS INDEX: 29.52 KG/M2 | SYSTOLIC BLOOD PRESSURE: 126 MMHG | HEART RATE: 84 BPM | WEIGHT: 194.8 LBS | HEIGHT: 68 IN | OXYGEN SATURATION: 96 % | DIASTOLIC BLOOD PRESSURE: 80 MMHG | TEMPERATURE: 97.7 F

## 2025-01-22 DIAGNOSIS — E11.9 TYPE 2 DIABETES MELLITUS WITHOUT COMPLICATION, WITHOUT LONG-TERM CURRENT USE OF INSULIN: Primary | ICD-10-CM

## 2025-01-22 PROCEDURE — 1126F AMNT PAIN NOTED NONE PRSNT: CPT | Performed by: FAMILY MEDICINE

## 2025-01-22 PROCEDURE — 1160F RVW MEDS BY RX/DR IN RCRD: CPT | Performed by: FAMILY MEDICINE

## 2025-01-22 PROCEDURE — 99213 OFFICE O/P EST LOW 20 MIN: CPT | Performed by: FAMILY MEDICINE

## 2025-01-22 PROCEDURE — 1159F MED LIST DOCD IN RCRD: CPT | Performed by: FAMILY MEDICINE

## 2025-01-22 RX ORDER — MULTIPLE VITAMINS W/ MINERALS TAB 9MG-400MCG
1 TAB ORAL DAILY
COMMUNITY

## 2025-01-22 RX ORDER — SEMAGLUTIDE 0.68 MG/ML
0.5 INJECTION, SOLUTION SUBCUTANEOUS WEEKLY
Qty: 3 ML | Refills: 0 | Status: SHIPPED | OUTPATIENT
Start: 2025-01-22

## 2025-01-22 RX ORDER — CHOLECALCIFEROL (VITAMIN D3) 25 MCG
1000 TABLET ORAL 2 TIMES DAILY
COMMUNITY

## 2025-01-23 NOTE — PROGRESS NOTES
Subjective   Mukul Chahal is a 59 y.o. female with   Chief Complaint   Patient presents with    Diabetes     1 month follow-up, tolerating Ozempic well now, had nausea the first 3 days after 1st injection   .    Diabetes       59-year-old white female with recently diagnosed type II non-insulin-dependent diabetes mellitus here for further medical management.  Patient has been started on semaglutide and has completed 4 weeks of 0.25 mg weekly.  She will start the 0.5 mg injections today.  She has a glucometer that initially she was getting fasting sugars in the 280 range.  These have markedly reduced now to the 180 range.  She has also lost approximately 10 pounds.  She had 2 to 3 days of slight nausea when she first started the medication which has resolved and she now no longer has side effects.  She has been trying to eat better and be more active.  The following portions of the patient's history were reviewed and updated as appropriate: allergies, current medications, past family history, past medical history, past social history, past surgical history and problem list.    Review of Systems   Endocrine:        Type II non-insulin-dependent diabetes mellitus       Objective     Vitals:    01/22/25 1410   BP: 126/80   Pulse: 84   Temp: 97.7 °F (36.5 °C)   SpO2: 96%       No results found for this or any previous visit (from the past 4 weeks).    Physical Exam  Vitals and nursing note reviewed.   Constitutional:       Appearance: Normal appearance. She is well-developed and well-groomed.   HENT:      Head: Normocephalic and atraumatic.   Neck:      Thyroid: No thyroid mass or thyromegaly.      Vascular: Normal carotid pulses. No carotid bruit.      Trachea: Trachea and phonation normal.   Cardiovascular:      Rate and Rhythm: Normal rate and regular rhythm.      Heart sounds: Normal heart sounds. No murmur heard.     No friction rub. No gallop.   Pulmonary:      Effort: Pulmonary effort is normal. No  respiratory distress.      Breath sounds: Normal breath sounds. No decreased breath sounds, wheezing, rhonchi or rales.   Musculoskeletal:      Cervical back: Neck supple.   Lymphadenopathy:      Cervical: No cervical adenopathy.   Skin:     General: Skin is warm and dry.      Findings: No rash.   Neurological:      Mental Status: She is alert and oriented to person, place, and time.   Psychiatric:         Attention and Perception: Attention and perception normal.         Mood and Affect: Mood and affect normal.         Speech: Speech normal.         Behavior: Behavior normal. Behavior is cooperative.         Thought Content: Thought content normal.         Cognition and Memory: Cognition and memory normal.         Judgment: Judgment normal.         Assessment & Plan   Diagnoses and all orders for this visit:    1. Type 2 diabetes mellitus without complication, without long-term current use of insulin (Primary)  -     Semaglutide,0.25 or 0.5MG/DOS, (Ozempic, 0.25 or 0.5 MG/DOSE,) 2 MG/3ML solution pen-injector; Inject 0.5 mg under the skin into the appropriate area as directed 1 (One) Time Per Week.  Dispense: 3 mL; Refill: 0    Patient will contact this office on her fourth dose of 0.5 mg to automatically increase this to the 1.0 mg dose.  We will see her back on week 3-4 of her weekly injections of 1.0 mg.  Will contact this office with any adverse effects of the above medication.    Return in about 2 months (around 3/22/2025) for Recheck.

## 2025-02-05 ENCOUNTER — TELEPHONE (OUTPATIENT)
Dept: FAMILY MEDICINE CLINIC | Facility: CLINIC | Age: 60
End: 2025-02-05

## 2025-02-05 NOTE — TELEPHONE ENCOUNTER
PATIENT STATES THAT HER CURRENT DEVICE IS OVER 5 YEARS OLD, AND WOULD LIKE A PRESCRIPTION FOR A NEW DEVICE AND SUPPLIES TO GO WITH IT.     Please advise.

## 2025-02-05 NOTE — TELEPHONE ENCOUNTER
"Caller: Mukul Chahal \"Katiuska Sage\"    Relationship: Self    Best call back number: [phone]     What medication are you requesting: NEW GLUCOMETER AND SUPPLIES    If a prescription is needed, what is your preferred pharmacy and phone number: [facility] [address] - [phone]  - [phone] FX     Additional notes: PATIENT STATES THAT HER CURRENT DEVICE IS OVER 5 YEARS OLD, AND WOULD LIKE A PRESCRIPTION FOR A NEW DEVICE AND SUPPLIES TO GO WITH IT.          "

## 2025-02-07 DIAGNOSIS — E11.9 CONTROLLED TYPE 2 DIABETES MELLITUS WITHOUT COMPLICATION, WITHOUT LONG-TERM CURRENT USE OF INSULIN: ICD-10-CM

## 2025-02-07 DIAGNOSIS — E11.9 TYPE 2 DIABETES MELLITUS WITHOUT COMPLICATION, WITHOUT LONG-TERM CURRENT USE OF INSULIN: Primary | ICD-10-CM

## 2025-02-27 DIAGNOSIS — E11.9 TYPE 2 DIABETES MELLITUS WITHOUT COMPLICATION, WITHOUT LONG-TERM CURRENT USE OF INSULIN: ICD-10-CM

## 2025-02-27 RX ORDER — SEMAGLUTIDE 0.68 MG/ML
0.5 INJECTION, SOLUTION SUBCUTANEOUS WEEKLY
Qty: 3 ML | Refills: 0 | Status: SHIPPED | OUTPATIENT
Start: 2025-02-27 | End: 2025-02-28 | Stop reason: SDUPTHER

## 2025-02-28 DIAGNOSIS — E11.9 TYPE 2 DIABETES MELLITUS WITHOUT COMPLICATION, WITHOUT LONG-TERM CURRENT USE OF INSULIN: ICD-10-CM

## 2025-02-28 RX ORDER — SEMAGLUTIDE 0.68 MG/ML
0.5 INJECTION, SOLUTION SUBCUTANEOUS WEEKLY
Qty: 3 ML | Refills: 0 | Status: SHIPPED | OUTPATIENT
Start: 2025-02-28

## 2025-03-18 ENCOUNTER — OFFICE VISIT (OUTPATIENT)
Dept: FAMILY MEDICINE CLINIC | Facility: CLINIC | Age: 60
End: 2025-03-18
Payer: MEDICAID

## 2025-03-18 VITALS
OXYGEN SATURATION: 100 % | BODY MASS INDEX: 28.7 KG/M2 | WEIGHT: 189.4 LBS | DIASTOLIC BLOOD PRESSURE: 80 MMHG | TEMPERATURE: 97.3 F | HEIGHT: 68 IN | SYSTOLIC BLOOD PRESSURE: 122 MMHG | HEART RATE: 70 BPM

## 2025-03-18 DIAGNOSIS — E11.9 TYPE 2 DIABETES MELLITUS WITHOUT COMPLICATION, WITHOUT LONG-TERM CURRENT USE OF INSULIN: Primary | ICD-10-CM

## 2025-03-18 DIAGNOSIS — E78.2 MIXED HYPERLIPIDEMIA: ICD-10-CM

## 2025-03-18 DIAGNOSIS — G47.33 OSA ON CPAP: ICD-10-CM

## 2025-03-18 NOTE — PROGRESS NOTES
Subjective   Mukul Chahal is a 59 y.o. female with   Chief Complaint   Patient presents with    Diabetes     2 month follow-up   .    Diabetes       59-year-old white female with relatively newly diagnosed type II non-insulin-dependent diabetes mellitus.  Patient was prescribed metformin XR at 750 mg using 2 daily.  For several weeks she used 1/day not understanding that it was a 2 pill regimen.  Her fasting blood sugars at that time were running 170-discovered that the prescription sent to a day and fairly quickly this fasting blood sugar reduced to 140.  She had also been prescribed Ozempic which did take some time to get approved.  She is now 2 weeks into the 0.5 mg weekly dose.  She is for the most part experienced no side effects and this dose will likely increase.  Patient states that she has lost 15 pounds since December of last year.  She did state that she had lost 4 to 5 pounds after her diagnosis and before the Ozempic was approved.  She admits that the Ozempic has provided some appetite suppression and she has lost an additional 10 pounds since that time.  The following portions of the patient's history were reviewed and updated as appropriate: allergies, current medications, past family history, past medical history, past social history, past surgical history and problem list.    Review of Systems   Endocrine:        Type II non-insulin-dependent diabetes mellitus, exogenous obesity       Objective     Vitals:    03/18/25 1444   BP: 122/80   Pulse: 70   Temp: 97.3 °F (36.3 °C)   SpO2: 100%       No results found for this or any previous visit (from the past 4 weeks).    Physical Exam  Vitals and nursing note reviewed.   Constitutional:       Appearance: Normal appearance. She is well-developed and well-groomed.      Comments: Overweight with a BMI of 28.8-15 pound weight loss since early December 2024   HENT:      Head: Normocephalic and atraumatic.   Neck:      Thyroid: No thyroid mass or  thyromegaly.      Vascular: Normal carotid pulses. No carotid bruit.      Trachea: Trachea and phonation normal.   Cardiovascular:      Rate and Rhythm: Normal rate and regular rhythm.      Heart sounds: Normal heart sounds. No murmur heard.     No friction rub. No gallop.   Pulmonary:      Effort: Pulmonary effort is normal. No respiratory distress.      Breath sounds: Normal breath sounds. No decreased breath sounds, wheezing, rhonchi or rales.   Musculoskeletal:      Cervical back: Neck supple.   Lymphadenopathy:      Cervical: No cervical adenopathy.   Skin:     General: Skin is warm and dry.      Findings: No rash.   Neurological:      Mental Status: She is alert and oriented to person, place, and time.   Psychiatric:         Attention and Perception: Attention and perception normal.         Mood and Affect: Mood and affect normal.         Speech: Speech normal.         Behavior: Behavior normal. Behavior is cooperative.         Thought Content: Thought content normal.         Cognition and Memory: Cognition and memory normal.         Judgment: Judgment normal.         Assessment & Plan   Diagnoses and all orders for this visit:    1. Type 2 diabetes mellitus without complication, without long-term current use of insulin (Primary)  -     Semaglutide, 1 MG/DOSE, (OZEMPIC) 2 MG/1.5ML solution pen-injector; Inject 1 mg under the skin into the appropriate area as directed 1 (One) Time Per Week.  Dispense: 3 mL; Refill: 2    2. Mixed hyperlipidemia    3. [place] on CPAP    Patient will contact this office in 6-week-will finish her last 2 weeks of 0.5 mg weekly and immediately advance to the 1 mg weekly dose.  She will report her fasting blood sugars to this office in 5 to 6 weeks and if she is getting numbers in the 110-120 range then laboratory studies will be drawn 2 to 3 months after with follow-up with me at that point.    Return in about 6 weeks (around 4/29/2025) for Recheck.

## 2025-06-30 DIAGNOSIS — E78.2 MIXED HYPERLIPIDEMIA: ICD-10-CM

## 2025-06-30 RX ORDER — ROSUVASTATIN CALCIUM 10 MG/1
10 TABLET, COATED ORAL DAILY
Qty: 90 TABLET | Refills: 1 | Status: SHIPPED | OUTPATIENT
Start: 2025-06-30

## 2025-07-10 DIAGNOSIS — E11.9 TYPE 2 DIABETES MELLITUS WITHOUT COMPLICATION, WITHOUT LONG-TERM CURRENT USE OF INSULIN: ICD-10-CM

## 2025-07-10 RX ORDER — SEMAGLUTIDE 1.34 MG/ML
1 INJECTION, SOLUTION SUBCUTANEOUS
Qty: 3 ML | Refills: 0 | Status: SHIPPED | OUTPATIENT
Start: 2025-07-10

## 2025-07-10 NOTE — TELEPHONE ENCOUNTER
Called patient to schedule.   She was unable to schedule at this time due to not having her calendar with her.   She stated that she would call back to schedule.